# Patient Record
Sex: FEMALE | Race: WHITE | URBAN - METROPOLITAN AREA
[De-identification: names, ages, dates, MRNs, and addresses within clinical notes are randomized per-mention and may not be internally consistent; named-entity substitution may affect disease eponyms.]

---

## 2017-02-07 ENCOUNTER — OFFICE VISIT (OUTPATIENT)
Dept: DERMATOLOGY | Age: 52
End: 2017-02-07

## 2017-02-07 DIAGNOSIS — D48.5 NEOPLASM OF UNCERTAIN BEHAVIOR OF SKIN: ICD-10-CM

## 2017-02-07 DIAGNOSIS — Z85.828 HISTORY OF NONMELANOMA SKIN CANCER: ICD-10-CM

## 2017-02-07 DIAGNOSIS — L82.1 SEBORRHEIC KERATOSES: ICD-10-CM

## 2017-02-07 DIAGNOSIS — D22.9 BENIGN NEVUS: Primary | ICD-10-CM

## 2017-02-07 DIAGNOSIS — L57.8 DIFFUSE PHOTODAMAGE OF SKIN: ICD-10-CM

## 2017-02-07 DIAGNOSIS — B07.8 OTHER VIRAL WARTS: ICD-10-CM

## 2017-02-07 DIAGNOSIS — Z85.820 HISTORY OF MELANOMA: ICD-10-CM

## 2017-02-07 PROCEDURE — 11100 PR BIOPSY OF SKIN LESION: CPT | Performed by: DERMATOLOGY

## 2017-02-07 PROCEDURE — 17110 DESTRUCTION B9 LES UP TO 14: CPT | Performed by: DERMATOLOGY

## 2017-02-07 PROCEDURE — 11101 PR BIOPSY, EACH ADDED LESION: CPT | Performed by: DERMATOLOGY

## 2017-02-07 PROCEDURE — 99202 OFFICE O/P NEW SF 15 MIN: CPT | Performed by: DERMATOLOGY

## 2017-02-14 ENCOUNTER — TELEPHONE (OUTPATIENT)
Dept: DERMATOLOGY | Age: 52
End: 2017-02-14

## 2017-03-09 ENCOUNTER — PROCEDURE VISIT (OUTPATIENT)
Dept: DERMATOLOGY | Age: 52
End: 2017-03-09

## 2017-03-09 DIAGNOSIS — C44.619: Primary | ICD-10-CM

## 2017-03-09 PROCEDURE — 17262 DSTRJ MAL LES T/A/L 1.1-2.0: CPT | Performed by: DERMATOLOGY

## 2017-11-21 ENCOUNTER — OFFICE VISIT (OUTPATIENT)
Dept: DERMATOLOGY | Age: 52
End: 2017-11-21

## 2017-11-21 DIAGNOSIS — B07.8 OTHER VIRAL WARTS: ICD-10-CM

## 2017-11-21 DIAGNOSIS — L57.0 KERATOSIS, ACTINIC: ICD-10-CM

## 2017-11-21 DIAGNOSIS — C44.519 BASAL CELL CARCINOMA OF BACK: ICD-10-CM

## 2017-11-21 DIAGNOSIS — D22.9 BENIGN NEVUS: ICD-10-CM

## 2017-11-21 DIAGNOSIS — Z85.820 HISTORY OF MELANOMA: ICD-10-CM

## 2017-11-21 DIAGNOSIS — D48.5 NEOPLASM OF UNCERTAIN BEHAVIOR OF SKIN: Primary | ICD-10-CM

## 2017-11-21 DIAGNOSIS — Z85.828 HISTORY OF NONMELANOMA SKIN CANCER: ICD-10-CM

## 2017-11-21 PROCEDURE — 17003 DESTRUCT PREMALG LES 2-14: CPT | Performed by: DERMATOLOGY

## 2017-11-21 PROCEDURE — 17000 DESTRUCT PREMALG LESION: CPT | Performed by: DERMATOLOGY

## 2017-11-21 PROCEDURE — 17261 DSTRJ MAL LES T/A/L .6-1.0CM: CPT | Performed by: DERMATOLOGY

## 2017-11-21 PROCEDURE — 11101 PR BIOPSY, EACH ADDED LESION: CPT | Performed by: DERMATOLOGY

## 2017-11-21 PROCEDURE — 17111 DESTRUCTION B9 LESIONS 15/>: CPT | Performed by: DERMATOLOGY

## 2017-11-21 PROCEDURE — 99214 OFFICE O/P EST MOD 30 MIN: CPT | Performed by: DERMATOLOGY

## 2017-11-21 PROCEDURE — 11100 PR BIOPSY OF SKIN LESION: CPT | Performed by: DERMATOLOGY

## 2017-11-21 NOTE — PROGRESS NOTES
[facility]) Dermatology  Bala Ortega M.D.  [phone]       Suni Bernal  1965    49 y.o. female     Date of Visit: 11/21/2017    Chief Complaint:   Chief Complaint   Patient presents with    Skin Exam        I was asked to see this patient by Dr. Pitts ref. provider found. History of Present Illness:  1. Total body skin exam    New erythematous papule left nasal supratip. Present for about a month. Not itching, bleeding, growing    Multiple nevi. Stable in size, shape, color. Asymptomatic. Patient is monitoring for change. Does wear hats and sunscreen. Multiple flat warts over her lower legs-stop shaving. They had increased in number. Raised. Scaly. 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 wide local excision  5/12 left inferior parasternal chest superficial basal cell carcinoma  5/12 left superior parasternal chest nodular basal cell carcinoma  5/12 left parasternal chest superficial basal cell carcinoma  9/14 melanoma in situ left posterior shoulder status post excision  9/14 right lower back dysplastic nevus with moderate dysplasia  7/15 superficial basal cell carcinoma mid upper back, mid lower back, right anterior leg status post curettage  7/15 malignant melanoma Breslow depth 0.7 without ulceration, no progression, mitotic rate less than 1 left anterior arm status post wide local excision    3/17 superficial basal cell carcinoma left anterior shoulder status post Aldara at outside hospital, recurrent. Then treated with curettage. Recurrent-refer to Mohs  11/17 Right upper back superficial basal cell carcinoma status post curettage    Sister with history of malignant melanoma.   Father with a history of nonmelanoma skin cancer    Review of Systems:  Constitutional: Reports general sense of well-being       Past Medical History, Surgical History, Family History, Medications and Allergies reviewed. Social History:   Social History     Social History    Marital status:      Spouse name: N/A    Number of children: N/A    Years of education: N/A     Occupational History    Not on file. Social History Main Topics    Smoking status: Never Smoker    Smokeless tobacco: Never Used    Alcohol use Yes    Drug use: Unknown    Sexual activity: Not on file     Other Topics Concern    Not on file     Social History Narrative    No narrative on file       Physical Examination       -General: Well-appearing, NAD  Normal affect. Total body skin exam including scalp, face, neck, chest, abdomen, back, bilateral upper extremities, bilateral lower extremities, ocular conjunctiva, external lips, and nails was performed. Underwear area not examined. Scattered on the trunk and extremities are multiple well-defined round and oval symmetric smoothly-bordered uniformly brown macules and papules. Multiple gritty erythematous papules face, chest-actinic keratoses. Multiple flat topped pink-yellow papules lower legs-flat warts. Multiple well-healed scars without sign of recurrence    Left nasal supratip 3 mm pink papule rule out basal cell carcinoma  Left lateral shoulder well-healed scar. New 4 mm pink papule and central scar-rule out recurrence  Right upper paraspinal back 6 mm pink papule rule out basal cell carcinoma                      Assessment and Plan     1. Neoplasm of uncertain behavior of skin -Left nasal supratip, left lateral shoulder, right upper paraspinal back-  -Discussed possible diagnosis. Patient agreeable to biopsy. Verbal consent obtained after risks (infection, bleeding, scar), benefits and alternatives explained. -Area(s) to be biopsied were marked with a surgical pen. Site(s) were cleansed with alcohol.  Local anesthesia achieved with 1% lidocaine with epinephrine/sodium bicarbonate. Shave biopsy performed with a razor blade. Hemostasis was achieved with aluminum chloride. The wound(s) were dressed with petrolatum and covered with a bandage. Specimen(s) sent to pathology. Pt educated re: risk of bleeding, infection, scar and wound care instructions. Right upper paraspinal back only:  Curettage performed after informed written consent obtained following explanation of risks, benefits, alternatives  -Local anesthesia acheived with 1% lidocaine with epinephrine/sodium bicarbonate. Sharp curettage performed in 3 different directions. First pass size 7mm. Hemostasis obtained with aluminum chloride.   -Edu re: bleeding, discomfort, infection, scar  -Edu re: wound care, risk of recurrence     2. Keratosis, actinic - 9-face/ chest lesion(s) treated w/ liquid nitrogen. Edu re: risk of blister formation, discomfort, scar, hypopigmentation. Discussed wound care. 3. Benign nevus - Benign acquired melanocytic nevi  -Recommend monthly self skin exams   -Educated regarding the ABCDEs of melanoma detection   -Call for any new/changing moles or concerning lesions  -Reviewed sun protective behavior -- sun avoidance during the peak hours of the day, sun-protective clothing (including hat and sunglasses), sunscreen use (water resistant, broad spectrum, SPF at least 30, need for reapplication every 2 to 3 hours), avoidance of tanning beds      4. Other viral warts - 22-legs lesion(s) treated w/ liquid nitrogen. Edu re: risk of blister formation, discomfort, scar, hypopigmentation. Discussed wound care. 5. History of melanoma - No evidence of recurrence. Discussed risk of subsequent skin cancers and increased risk of melanoma. Reviewed importance of monitoring skin for change and sun protection with hats and sunscreen, as well as sun avoidance. 6. History of nonmelanoma skin cancer - No evidence of recurrence.  Discussed risk of

## 2017-11-30 ENCOUNTER — TELEPHONE (OUTPATIENT)
Dept: DERMATOLOGY | Age: 52
End: 2017-11-30

## 2017-11-30 DIAGNOSIS — C44.619 BASAL CELL CARCINOMA OF SKIN OF LEFT UPPER EXTREMITY, INCLUDING SHOULDER: Primary | ICD-10-CM

## 2017-11-30 NOTE — TELEPHONE ENCOUNTER
Reviewed results of the biopsy with the patient. Date of biopsy: 11/21/2017  Site of biopsy: R upper back  Result: Basal cell carcinoma, superficial    Plan: Treated with curette at biopsy    Date of biopsy: 11/21/2017  Site of biopsy: L lateral shoulder  Result: Basal cell carcinoma, superficial    Plan: Mohs, referral placed for Dr. Veronica Mccoy    Date of biopsy: 11/21/2017  Site of biopsy: L nasal supratip  Result: Basosquamous acanthoma, inflamed    Plan: No further treatment needed. The patient expressed understanding of the plan.

## 2018-01-30 ENCOUNTER — OFFICE VISIT (OUTPATIENT)
Dept: DERMATOLOGY | Age: 53
End: 2018-01-30

## 2018-01-30 DIAGNOSIS — T81.89XA SUTURE GRANULOMA, INITIAL ENCOUNTER: ICD-10-CM

## 2018-01-30 DIAGNOSIS — B07.8 OTHER VIRAL WARTS: Primary | ICD-10-CM

## 2018-01-30 PROCEDURE — 99999 PR OFFICE/OUTPT VISIT,PROCEDURE ONLY: CPT | Performed by: DERMATOLOGY

## 2018-01-30 PROCEDURE — 17111 DESTRUCTION B9 LESIONS 15/>: CPT | Performed by: DERMATOLOGY

## 2018-01-30 NOTE — PROGRESS NOTES
Systems:  Constitutional: Reports general sense of well-being       Past Medical History, Surgical History, Family History, Medications and Allergies reviewed. Social History:   Social History     Social History    Marital status:      Spouse name: N/A    Number of children: N/A    Years of education: N/A     Occupational History    Not on file. Social History Main Topics    Smoking status: Never Smoker    Smokeless tobacco: Never Used    Alcohol use Yes    Drug use: Unknown    Sexual activity: Not on file     Other Topics Concern    Not on file     Social History Narrative    No narrative on file       Physical Examination       -General: Well-appearing, NAD  Multiple flat topped pink papules over her lower legs ranging in size from 1-4 mm-viral verruca. Left lateral shoulder well-healed excision site with 4 mm crusted inflammatory papule inferior margin      Assessment and Plan     1. Other viral warts - 32-legs lesion(s) treated w/ liquid nitrogen. Edu re: risk of blister formation, discomfort, scar, hypopigmentation. Discussed wound care. 2. Suture granuloma, initial encounter -After the risks complications alternatives were explained to the patient informed consent was obtained. Lesion was cleansed with alcohol and opened with a 22-gauge needle. No suture visualized.   Wound care instructions given to the patient    Total body skin exam return visit with ongoing treatment of verruca

## 2018-04-03 ENCOUNTER — OFFICE VISIT (OUTPATIENT)
Dept: DERMATOLOGY | Age: 53
End: 2018-04-03

## 2018-04-03 DIAGNOSIS — D48.5 NEOPLASM OF UNCERTAIN BEHAVIOR OF SKIN: Primary | ICD-10-CM

## 2018-04-03 DIAGNOSIS — C44.612 BASAL CELL CARCINOMA OF RIGHT UPPER ARM: ICD-10-CM

## 2018-04-03 DIAGNOSIS — D22.9 BENIGN NEVUS: ICD-10-CM

## 2018-04-03 DIAGNOSIS — Z85.820 HISTORY OF MELANOMA: ICD-10-CM

## 2018-04-03 DIAGNOSIS — L65.8 FEMALE PATTERN ALOPECIA: ICD-10-CM

## 2018-04-03 DIAGNOSIS — Z85.828 HISTORY OF NONMELANOMA SKIN CANCER: ICD-10-CM

## 2018-04-03 DIAGNOSIS — L57.8 DIFFUSE PHOTODAMAGE OF SKIN: ICD-10-CM

## 2018-04-03 DIAGNOSIS — B07.8 OTHER VIRAL WARTS: ICD-10-CM

## 2018-04-03 PROCEDURE — 17261 DSTRJ MAL LES T/A/L .6-1.0CM: CPT | Performed by: DERMATOLOGY

## 2018-04-03 PROCEDURE — 17111 DESTRUCTION B9 LESIONS 15/>: CPT | Performed by: DERMATOLOGY

## 2018-04-03 PROCEDURE — 11100 PR BIOPSY OF SKIN LESION: CPT | Performed by: DERMATOLOGY

## 2018-04-03 PROCEDURE — 99214 OFFICE O/P EST MOD 30 MIN: CPT | Performed by: DERMATOLOGY

## 2018-04-10 ENCOUNTER — TELEPHONE (OUTPATIENT)
Dept: DERMATOLOGY | Age: 53
End: 2018-04-10

## 2018-07-17 ENCOUNTER — OFFICE VISIT (OUTPATIENT)
Dept: DERMATOLOGY | Age: 53
End: 2018-07-17

## 2018-07-17 DIAGNOSIS — D22.9 BENIGN NEVUS: ICD-10-CM

## 2018-07-17 DIAGNOSIS — Z85.828 HISTORY OF NONMELANOMA SKIN CANCER: ICD-10-CM

## 2018-07-17 DIAGNOSIS — Z85.820 HISTORY OF MELANOMA: ICD-10-CM

## 2018-07-17 DIAGNOSIS — L91.0 KELOID: ICD-10-CM

## 2018-07-17 DIAGNOSIS — L57.8 DIFFUSE PHOTODAMAGE OF SKIN: ICD-10-CM

## 2018-07-17 DIAGNOSIS — C44.519 BASAL CELL CARCINOMA OF BACK: ICD-10-CM

## 2018-07-17 DIAGNOSIS — D04.5 SQUAMOUS CELL CARCINOMA IN SITU OF SKIN OF CHEST: ICD-10-CM

## 2018-07-17 DIAGNOSIS — D48.5 NEOPLASM OF UNCERTAIN BEHAVIOR OF SKIN: Primary | ICD-10-CM

## 2018-07-17 PROCEDURE — 11900 INJECT SKIN LESIONS </W 7: CPT | Performed by: DERMATOLOGY

## 2018-07-17 PROCEDURE — 11100 PR BIOPSY OF SKIN LESION: CPT | Performed by: DERMATOLOGY

## 2018-07-17 PROCEDURE — 17262 DSTRJ MAL LES T/A/L 1.1-2.0: CPT | Performed by: DERMATOLOGY

## 2018-07-17 PROCEDURE — 99214 OFFICE O/P EST MOD 30 MIN: CPT | Performed by: DERMATOLOGY

## 2018-07-17 PROCEDURE — 17263 DSTRJ MAL LES T/A/L 2.1-3.0: CPT | Performed by: DERMATOLOGY

## 2018-07-17 RX ORDER — TRIAMCINOLONE ACETONIDE 40 MG/ML
40 INJECTION, SUSPENSION INTRA-ARTICULAR; INTRAMUSCULAR ONCE
Status: COMPLETED | OUTPATIENT
Start: 2018-07-17 | End: 2018-07-17

## 2018-07-17 RX ORDER — LORAZEPAM 1 MG/1
TABLET ORAL
COMMUNITY
Start: 2018-05-18

## 2018-07-17 RX ADMIN — TRIAMCINOLONE ACETONIDE 40 MG: 40 INJECTION, SUSPENSION INTRA-ARTICULAR; INTRAMUSCULAR at 14:37

## 2018-07-17 NOTE — PROGRESS NOTES
basal cell carcinoma status post curettage  11/17 left lateral shoulder-recurrent basal cell carcinoma status post Aldara, curettage 3/17,Mohs 12/17 4/18 right lateral upper arm superficial basal cell carcinoma status post curettage     Sister with history of malignant melanoma.  Father with a history of nonmelanoma skin cancer    Review of Systems:  Constitutional: Reports general sense of well-being   Skin: No new rashes    Past Medical History, Surgical History, Family History, Medications and Allergies reviewed. Social History:   Social History     Social History    Marital status:      Spouse name: N/A    Number of children: N/A    Years of education: N/A     Occupational History    Not on file. Social History Main Topics    Smoking status: Never Smoker    Smokeless tobacco: Never Used    Alcohol use Yes    Drug use: Unknown    Sexual activity: Not on file     Other Topics Concern    Not on file     Social History Narrative    No narrative on file       Physical Examination       -General: Well-appearing, NAD  Normal affect. Total body skin exam including scalp, face, neck, chest, abdomen, back, bilateral upper extremities, bilateral lower extremities, ocular conjunctiva, external lips, and nails was performed. Examination normal unless stated below. Underwear area not examined. Scattered on the trunk and extremities are multiple well-defined round and oval symmetric smoothly-bordered uniformly brown macules and papules. Raised firm pink scar left lateral shoulder-keloid  Diffuse back ground photo damage with freckling and lentigines  Multiple raised pink-orange papules over her lower legs and thighs-flat warts, seborrheic keratoses.   Representative lesion- raised pink- orange slightly scaly papule right anterior shin, 4 mm  Right mid lateral back 2.1 cm erythematous plaque-rule out basal cell carcinoma  Left inframammary chest 1.0 cm erythematous plaque, rule out basal cell carcinoma                Multiple well-healed scars at sites of prior skin cancers           Assessment and Plan     1. Neoplasm of uncertain behavior of skin -Right anterior shin, right lateral back, left inframammary chest--Discussed possible diagnosis. Patient agreeable to biopsy. Verbal consent obtained after risks (infection, bleeding, scar), benefits and alternatives explained. -Area(s) to be biopsied were marked with a surgical pen. Site(s) were cleansed with alcohol. Local anesthesia achieved with 1% lidocaine with epinephrine/sodium bicarbonate. Shave biopsy performed with a razor blade. Hemostasis was achieved with aluminum chloride. The wound(s) were dressed with petrolatum and covered with a bandage. Specimen(s) sent to pathology. Pt educated re: risk of bleeding, infection, scar and wound care instructions. Right lateral back and left inframammary chest only:   Curettage performed after informed written consent obtained following explanation of risks, benefits, alternatives  -Local anesthesia acheived with 1% lidocaine with epinephrine/sodium bicarbonate. Sharp curettage performed in 3 different directions. First pass size 22mm right mid lateral back, 1.1 cm left inframammary chest. Hemostasis obtained with aluminum chloride.   -Edu re: bleeding, discomfort, infection, scar  -Edu re: wound care, risk of recurrence     2. Keloid - -IL kenalog 20 mg/ml; total .2 ml to 1 lesion(s). Edu re: atrophy, dyspigmentation. 3. Benign nevus - Benign acquired melanocytic nevi  -Recommend monthly self skin exams   -Educated regarding the ABCDEs of melanoma detection   -Call for any new/changing moles or concerning lesions  -Reviewed sun protective behavior -- sun avoidance during the peak hours of the day, sun-protective clothing (including hat and sunglasses), sunscreen use (water resistant, broad spectrum, SPF at least 30, need for reapplication every 2 to 3 hours), avoidance of tanning beds      4.

## 2018-07-24 ENCOUNTER — TELEPHONE (OUTPATIENT)
Dept: DERMATOLOGY | Age: 53
End: 2018-07-24

## 2018-10-30 ENCOUNTER — OFFICE VISIT (OUTPATIENT)
Dept: DERMATOLOGY | Age: 53
End: 2018-10-30
Payer: COMMERCIAL

## 2018-10-30 DIAGNOSIS — Z85.828 HISTORY OF NONMELANOMA SKIN CANCER: ICD-10-CM

## 2018-10-30 DIAGNOSIS — L57.0 KERATOSIS, ACTINIC: ICD-10-CM

## 2018-10-30 DIAGNOSIS — Z85.820 HISTORY OF MELANOMA: ICD-10-CM

## 2018-10-30 DIAGNOSIS — D48.5 NEOPLASM OF UNCERTAIN BEHAVIOR OF SKIN: Primary | ICD-10-CM

## 2018-10-30 DIAGNOSIS — L57.8 DIFFUSE PHOTODAMAGE OF SKIN: ICD-10-CM

## 2018-10-30 DIAGNOSIS — D22.9 BENIGN NEVUS: ICD-10-CM

## 2018-10-30 DIAGNOSIS — L91.0 KELOID: ICD-10-CM

## 2018-10-30 DIAGNOSIS — B07.8 OTHER VIRAL WARTS: ICD-10-CM

## 2018-10-30 PROCEDURE — 11100 PR BIOPSY OF SKIN LESION: CPT | Performed by: DERMATOLOGY

## 2018-10-30 PROCEDURE — 17110 DESTRUCTION B9 LES UP TO 14: CPT | Performed by: DERMATOLOGY

## 2018-10-30 PROCEDURE — 99214 OFFICE O/P EST MOD 30 MIN: CPT | Performed by: DERMATOLOGY

## 2018-10-30 PROCEDURE — 11101 PR BIOPSY, EACH ADDED LESION: CPT | Performed by: DERMATOLOGY

## 2018-10-30 PROCEDURE — 17000 DESTRUCT PREMALG LESION: CPT | Performed by: DERMATOLOGY

## 2018-10-30 RX ORDER — IMIQUIMOD 12.5 MG/.25G
CREAM TOPICAL
Qty: 24 EACH | Refills: 5 | Status: SHIPPED | OUTPATIENT
Start: 2018-10-30 | End: 2018-11-06

## 2018-10-30 NOTE — PROGRESS NOTES
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  Father with a history of nonmelanoma skin cancer    Review of Systems:  Constitutional: Reports general sense of well-being   Skin: No new rashes or changing pigmented lesions    Past Medical History, Surgical History, Family History, Medications and Allergies reviewed. Social History:   Social History     Social History    Marital status:      Spouse name: N/A    Number of children: N/A    Years of education: N/A     Occupational History    Not on file. Social History Main Topics    Smoking status: Never Smoker    Smokeless tobacco: Never Used    Alcohol use Yes    Drug use: Unknown    Sexual activity: Not on file     Other Topics Concern    Not on file     Social History Narrative    No narrative on file       Physical Examination       -General: Well-appearing, NAD  Normal affect. Total body skin exam including scalp, face, neck, chest, abdomen, back, bilateral upper extremities, bilateral lower extremities, ocular conjunctiva, external lips, and nails was performed. Examination normal unless stated below. Underwear area not examined. Scattered on the trunk and extremities are multiple well-defined round and oval symmetric smoothly-bordered uniformly brown macules and papules. Keloidal scar left upper arm. Diffuse background photodamage with freckling and lentigines. Gritty erythematous papule left mid dorsal forearm. Right lateral forehead at hairline 5 mm shiny pink papule rule out basal cell carcinoma  Left supraclavicular neck 6mm raised pink papule rule out basal cell carcinoma  Right lower back 9 mm firm tan papule adjacent to light tan papule-rule out atypia    Assessment and Plan     1.  Neoplasm of uncertain behavior of skin -Left supraclavicular neck,

## 2018-11-02 LAB — DERMATOLOGY PATHOLOGY REPORT: ABNORMAL

## 2018-11-05 ENCOUNTER — TELEPHONE (OUTPATIENT)
Dept: DERMATOLOGY | Age: 53
End: 2018-11-05

## 2018-11-05 DIAGNOSIS — C44.319 BASAL CELL CARCINOMA (BCC) OF FOREHEAD: Primary | ICD-10-CM

## 2019-03-05 ENCOUNTER — OFFICE VISIT (OUTPATIENT)
Dept: DERMATOLOGY | Age: 54
End: 2019-03-05
Payer: COMMERCIAL

## 2019-03-05 DIAGNOSIS — D48.5 NEOPLASM OF UNCERTAIN BEHAVIOR OF SKIN: Primary | ICD-10-CM

## 2019-03-05 DIAGNOSIS — Z85.828 HISTORY OF NONMELANOMA SKIN CANCER: ICD-10-CM

## 2019-03-05 DIAGNOSIS — Z85.820 HISTORY OF MELANOMA: ICD-10-CM

## 2019-03-05 DIAGNOSIS — B07.8 OTHER VIRAL WARTS: ICD-10-CM

## 2019-03-05 DIAGNOSIS — L91.0 KELOID: ICD-10-CM

## 2019-03-05 DIAGNOSIS — D22.9 BENIGN NEVUS: ICD-10-CM

## 2019-03-05 DIAGNOSIS — L57.0 KERATOSIS, ACTINIC: ICD-10-CM

## 2019-03-05 PROCEDURE — 11102 TANGNTL BX SKIN SINGLE LES: CPT | Performed by: DERMATOLOGY

## 2019-03-05 PROCEDURE — 99214 OFFICE O/P EST MOD 30 MIN: CPT | Performed by: DERMATOLOGY

## 2019-03-06 ENCOUNTER — TELEPHONE (OUTPATIENT)
Dept: DERMATOLOGY | Age: 54
End: 2019-03-06

## 2019-03-11 LAB — DERMATOLOGY PATHOLOGY REPORT: NORMAL

## 2019-05-08 ENCOUNTER — NURSE ONLY (OUTPATIENT)
Dept: DERMATOLOGY | Age: 54
End: 2019-05-08

## 2019-05-08 DIAGNOSIS — L57.0 KERATOSIS, ACTINIC: Primary | ICD-10-CM

## 2019-05-08 NOTE — PROGRESS NOTES
Photodynamic Therapy Procedure Note     Diagnosis: Actinic Keratoses    Location:    -Face    -Chest     Procedure: The sites of treatment were verified with the patient and the previous progress note. The area to be treated was cleansed with alcohol. Nicholes Serge was applied to the indicated area(s) above. 1 hour (time in hours) later the patient was exposed to blue light via the [place] for 16 minutes and 40 seconds. 2 Levulan Kerastick(s) were used. BelRaquel Fernandesevelynshi [address]: [id number]    The patient was provided with appropriate eye protection. The patient tolerated the procedure well. There were no immediate complications. The patient was educated regarding the potential side effects and risks including burning, stinging, erythema, edema, crusting, and dyspigmentation. The patient was instructed to avoid sun or intense light for 48 hours after the treatment.

## 2019-11-26 ENCOUNTER — OFFICE VISIT (OUTPATIENT)
Dept: DERMATOLOGY | Age: 54
End: 2019-11-26
Payer: COMMERCIAL

## 2019-11-26 DIAGNOSIS — Z85.828 HISTORY OF NONMELANOMA SKIN CANCER: ICD-10-CM

## 2019-11-26 DIAGNOSIS — Z85.820 HISTORY OF MELANOMA: ICD-10-CM

## 2019-11-26 DIAGNOSIS — L57.0 KERATOSIS, ACTINIC: ICD-10-CM

## 2019-11-26 DIAGNOSIS — L82.0 SEBORRHEIC KERATOSES, INFLAMED: ICD-10-CM

## 2019-11-26 DIAGNOSIS — D22.9 BENIGN NEVUS: ICD-10-CM

## 2019-11-26 DIAGNOSIS — B07.8 OTHER VIRAL WARTS: ICD-10-CM

## 2019-11-26 DIAGNOSIS — D48.5 NEOPLASM OF UNCERTAIN BEHAVIOR OF SKIN: Primary | ICD-10-CM

## 2019-11-26 PROCEDURE — 17110 DESTRUCTION B9 LES UP TO 14: CPT | Performed by: DERMATOLOGY

## 2019-11-26 PROCEDURE — 11103 TANGNTL BX SKIN EA SEP/ADDL: CPT | Performed by: DERMATOLOGY

## 2019-11-26 PROCEDURE — 99214 OFFICE O/P EST MOD 30 MIN: CPT | Performed by: DERMATOLOGY

## 2019-11-26 PROCEDURE — 11102 TANGNTL BX SKIN SINGLE LES: CPT | Performed by: DERMATOLOGY

## 2019-11-26 RX ORDER — FLUOROURACIL 50 MG/G
CREAM TOPICAL
Qty: 40 G | Refills: 0 | Status: SHIPPED | OUTPATIENT
Start: 2019-11-26 | End: 2020-12-21

## 2019-12-03 LAB — DERMATOLOGY PATHOLOGY REPORT: NORMAL

## 2020-03-18 ENCOUNTER — TELEPHONE (OUTPATIENT)
Dept: DERMATOLOGY | Age: 55
End: 2020-03-18

## 2020-06-03 ENCOUNTER — OFFICE VISIT (OUTPATIENT)
Dept: DERMATOLOGY | Age: 55
End: 2020-06-03
Payer: COMMERCIAL

## 2020-06-03 PROCEDURE — 99213 OFFICE O/P EST LOW 20 MIN: CPT | Performed by: DERMATOLOGY

## 2020-06-03 NOTE — PROGRESS NOTES
lateral back superficial basal cell carcinoma status post curettage  12/18 right mid lateral forehead nodular basal cell carcinoma status post Mohs  5/19 face and chest PDT  12/19 Efudex forehead, temples, preauricular cheeks     Sister with history of malignant melanoma.  Father with a history of nonmelanoma skin cancer    Review of Systems:  Constitutional: Reports general sense of well-being       Past Medical History, Surgical History, Family History, Medications and Allergies reviewed. Social History:   Social History     Socioeconomic History    Marital status:      Spouse name: Not on file    Number of children: Not on file    Years of education: Not on file    Highest education level: Not on file   Occupational History    Not on file   Social Needs    Financial resource strain: Not on file    Food insecurity     Worry: Not on file     Inability: Not on file    Transportation needs     Medical: Not on file     Non-medical: Not on file   Tobacco Use    Smoking status: Never Smoker    Smokeless tobacco: Never Used   Substance and Sexual Activity    Alcohol use: Yes    Drug use: Not on file    Sexual activity: Not on file   Lifestyle    Physical activity     Days per week: Not on file     Minutes per session: Not on file    Stress: Not on file   Relationships    Social connections     Talks on phone: Not on file     Gets together: Not on file     Attends Sabianist service: Not on file     Active member of club or organization: Not on file     Attends meetings of clubs or organizations: Not on file     Relationship status: Not on file    Intimate partner violence     Fear of current or ex partner: Not on file     Emotionally abused: Not on file     Physically abused: Not on file     Forced sexual activity: Not on file   Other Topics Concern    Not on file   Social History Narrative    Not on file       Physical Examination       -General: Well-appearing, NAD  Normal affect.   Total

## 2020-10-21 ENCOUNTER — OFFICE VISIT (OUTPATIENT)
Dept: DERMATOLOGY | Age: 55
End: 2020-10-21
Payer: COMMERCIAL

## 2020-10-21 VITALS — TEMPERATURE: 98 F

## 2020-10-21 PROCEDURE — 99214 OFFICE O/P EST MOD 30 MIN: CPT | Performed by: DERMATOLOGY

## 2020-10-21 PROCEDURE — 11102 TANGNTL BX SKIN SINGLE LES: CPT | Performed by: DERMATOLOGY

## 2020-10-21 PROCEDURE — 11103 TANGNTL BX SKIN EA SEP/ADDL: CPT | Performed by: DERMATOLOGY

## 2020-10-21 NOTE — PROGRESS NOTES
[facility]) Dermatology  Zeinab Herrera M.D.  [phone]       Krystle Gracia  1965    49 y.o. female     Date of Visit: 10/21/2020    Chief Complaint:   Chief Complaint   Patient presents with    Skin Lesion        I was asked to see this patient by Dr. Pitts ref. provider found. History of Present Illness:  1. Total-body skin exam    New erythematous papule right distal anterior lower leg-present for at least a month. Not itching, bleeding. Asymptomatic    New firm tan papule left ulnar forearm. Present 1 to 2 months. Developed suddenly. Not itching, bleeding. Asymptomatic. No preceding arthropod bite    Flat warts bilateral lower legs-increasing in size and number and spreading. Has tried liquid nitrogen and imiquimod previously. Actinic keratoses-slow recurrence of her involvement on her forehead, temples. Also with multiple actinic keratoses chest.  Dry but not itching, bleeding. Asymptomatic. Multiple nevi. Stable in size, shape, color.   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 wide local excision  5/12 left inferior parasternal chest superficial basal cell carcinoma  5/12 left superior parasternal chest nodular basal cell carcinoma  5/12 left parasternal chest superficial basal cell carcinoma  9/14 melanoma in situ left posterior shoulder status post excision  9/14 right lower back dysplastic nevus with moderate dysplasia  7/15 superficial basal cell carcinoma mid upper back, mid lower back, right anterior leg status post curettage  7/15 malignant melanoma Breslow depth 0.7 without ulceration, no progression, mitotic rate less than 1 left anterior arm status post wide local excision     3/17 superficial basal cell carcinoma left anterior shoulder status post Aldara at outside hospital, recurrent.  Then treated with curettage. Recurrent-refer to Mohs  11/17 Right upper back superficial basal cell carcinoma status post curettage  11/17 left lateral shoulder-recurrent basal cell carcinoma status post Aldara, curettage 3/17,Mohs 12/17 4/18 right lateral upper arm superficial basal cell carcinoma status post curettage  7/18 left inframammary chest Bowen's disease status post curettage  7/18 right lateral back superficial basal cell carcinoma status post curettage  12/18 right mid lateral forehead nodular basal cell carcinoma status post Mohs  5/19 face and chest PDT  12/19 Efudex forehead, temples, preauricular cheeks     Sister with history of malignant melanoma.  Father with a history of nonmelanoma skin cancer       Review of Systems:  Constitutional: Reports general sense of well-being   Skin-no new rashes    Past Medical History, Surgical History, Family History, Medications and Allergies reviewed. Social History:   Social History     Socioeconomic History    Marital status:      Spouse name: Not on file    Number of children: Not on file    Years of education: Not on file    Highest education level: Not on file   Occupational History    Not on file   Social Needs    Financial resource strain: Not on file    Food insecurity     Worry: Not on file     Inability: Not on file    Transportation needs     Medical: Not on file     Non-medical: Not on file   Tobacco Use    Smoking status: Never Smoker    Smokeless tobacco: Never Used   Substance and Sexual Activity    Alcohol use:  Yes    Drug use: Not on file    Sexual activity: Not on file   Lifestyle    Physical activity     Days per week: Not on file     Minutes per session: Not on file    Stress: Not on file   Relationships    Social connections     Talks on phone: Not on file     Gets together: Not on file     Attends Yazidism service: Not on file     Active member of club or organization: Not on file     Attends meetings of clubs or organizations: Not on file     Relationship status: Not on file    Intimate partner violence     Fear of current or ex partner: Not on file     Emotionally abused: Not on file     Physically abused: Not on file     Forced sexual activity: Not on file   Other Topics Concern    Not on file   Social History Narrative    Not on file       Physical Examination       -General: Well-appearing, NAD  Normal affect. Total body skin exam including scalp, face, neck, chest, abdomen, back, bilateral upper extremities, bilateral lower extremities, ocular conjunctiva, external lips, and nails was performed. Examination normal unless stated below. Underwear area not examined. Scattered on the trunk and extremities are multiple well-defined round and oval symmetric smoothly-bordered uniformly brown macules and papules. Scattered gritty erythematous papules forehead, temples, chest  Multiple flattopped pink papules some of which are slightly scaly over her legs, thighs. Multiple well-healed scars no sign of recurrence              Left distal medial lower leg 4 mm erythematous papule rule out basal cell carcinoma  Right upper cutaneous lip 4 mm erythematous papule rule out basal cell carcinoma  Left ulnar forearm 6 mm firm tan papule dermatofibroma rule out atypia    Assessment and Plan     1. Neoplasm of uncertain behavior of skin -left distal medial lower leg, right upper cutaneous lip, left ulnar forearm-Discussed possible diagnosis. Patient agreeable to biopsy. Verbal consent obtained after risks (infection, bleeding, scar), benefits and alternatives explained. -Area(s) to be biopsied were marked with a surgical pen. Site(s) were cleansed with alcohol. Local anesthesia achieved with 1% lidocaine with epinephrine/sodium bicarbonate. Shave biopsy performed with a razor blade.  Hemostasis was achieved with aluminum chloride. The wound(s) were dressed with petrolatum and covered with a bandage. Specimen(s) sent to pathology. Pt educated re: risk of bleeding, infection, scar and wound care instructions. Left distal medial lower leg only:    Curettage performed after informed written consent obtained following explanation of risks, benefits, alternatives  -Local anesthesia acheived with 1% lidocaine with epinephrine/sodium bicarbonate. Sharp curettage performed in 3 different directions. First pass size 6mm. Hemostasis obtained with aluminum chloride.   -Edu re: bleeding, discomfort, infection, scar  -Edu re: wound care, risk of recurrence       2. Benign nevus - Benign acquired melanocytic nevi  -Recommend monthly self skin exams   -Educated regarding the ABCDEs of melanoma detection   -Call for any new/changing moles or concerning lesions  -Reviewed sun protective behavior -- sun avoidance during the peak hours of the day, sun-protective clothing (including hat and sunglasses), sunscreen use (water resistant, broad spectrum, SPF at least 30, need for reapplication every 2 to 3 hours), avoidance of tanning beds      3. Flat wart-set up multiple sequential 2 to 3-week follow-up for ongoing treatment with liquid nitrogen   4. Keratosis, actinic-Efudex 5% cream twice daily for 2 weeks forehead, temples, cheeks, chest.  Reviewed side effects, contraindications, proper application. Patient plans to treat over the winter   5. History of nonmelanoma skin cancer - No evidence of recurrence. Discussed risk of subsequent skin cancers and increased risk of melanoma. Reviewed importance of monitoring skin for change and sun protection with hats and sunscreen, as well as sun avoidance. 6. History of melanoma - No evidence of recurrence. Discussed risk of subsequent skin cancers and increased risk of melanoma. Reviewed importance of monitoring skin for change and sun protection with hats and sunscreen, as well as sun avoidance.

## 2020-10-23 LAB — DERMATOLOGY PATHOLOGY REPORT: NORMAL

## 2020-11-19 ENCOUNTER — OFFICE VISIT (OUTPATIENT)
Dept: DERMATOLOGY | Age: 55
End: 2020-11-19
Payer: COMMERCIAL

## 2020-11-19 VITALS — TEMPERATURE: 97.5 F

## 2020-11-19 PROCEDURE — 99213 OFFICE O/P EST LOW 20 MIN: CPT | Performed by: DERMATOLOGY

## 2020-11-19 PROCEDURE — 17111 DESTRUCTION B9 LESIONS 15/>: CPT | Performed by: DERMATOLOGY

## 2020-11-19 NOTE — PROGRESS NOTES
[facility]) Dermatology  Joie Bull M.D.  [phone]       Juventino Hernandez  1965    49 y.o. female     Date of Visit: 11/19/2020    Chief Complaint:   Chief Complaint   Patient presents with    Skin Lesion     bilateral leg spots-LN2        I was asked to see this patient by Dr. Pitts ref. provider found. History of Present Illness:  1. Patient presents today for sequential treatments of flat warts lower legs. Multiple pink flat warts over her lower and upper legs-has slowly increased in size and number over years. Now raised, scaly, bothersome. Spreading. History of malignant melanoma-multiple nevi. Has not noticed any changing in size, shape, color. 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 wide local excision  5/12 left inferior parasternal chest superficial basal cell carcinoma  5/12 left superior parasternal chest nodular basal cell carcinoma  5/12 left parasternal chest superficial basal cell carcinoma  9/14 melanoma in situ left posterior shoulder status post excision  9/14 right lower back dysplastic nevus with moderate dysplasia  7/15 superficial basal cell carcinoma mid upper back, mid lower back, right anterior leg status post curettage  7/15 malignant melanoma Breslow depth 0.7 without ulceration, no progression, mitotic rate less than 1 left anterior arm status post wide local excision     3/17 superficial basal cell carcinoma left anterior shoulder status post Aldara at outside hospital, recurrent.  Then treated with curettage. Recurrent-refer to Mohs  11/17 Right upper back superficial basal cell carcinoma status post curettage  11/17 left lateral shoulder-recurrent basal cell carcinoma status post Aldara, curettage 3/17,Mohs 12/17 4/18 right lateral upper arm superficial basal cell carcinoma status post curettage  7/18 left inframammary chest Bowen's disease status post curettage  7/18 right lateral back superficial basal cell carcinoma status post curettage  12/18 right mid lateral forehead nodular basal cell carcinoma status post Mohs  5/19 face and chest PDT  12/19 Efudex forehead, temples, preauricular cheeks  10/20 left distal medial lower leg hypertrophic actinic keratosis status post curettage     Sister with history of malignant melanoma.  Father with a history of nonmelanoma skin cancer    Review of Systems:  Constitutional: Reports general sense of well-being       Past Medical History, Surgical History, Family History, Medications and Allergies reviewed. Social History:   Social History     Socioeconomic History    Marital status:      Spouse name: Not on file    Number of children: Not on file    Years of education: Not on file    Highest education level: Not on file   Occupational History    Not on file   Social Needs    Financial resource strain: Not on file    Food insecurity     Worry: Not on file     Inability: Not on file    Transportation needs     Medical: Not on file     Non-medical: Not on file   Tobacco Use    Smoking status: Never Smoker    Smokeless tobacco: Never Used   Substance and Sexual Activity    Alcohol use:  Yes    Drug use: Not on file    Sexual activity: Not on file   Lifestyle    Physical activity     Days per week: Not on file     Minutes per session: Not on file    Stress: Not on file   Relationships    Social connections     Talks on phone: Not on file     Gets together: Not on file     Attends Pentecostalism service: Not on file     Active member of club or organization: Not on file     Attends meetings of clubs or organizations: Not on file     Relationship status: Not on file    Intimate partner violence     Fear of current or ex partner: Not on file     Emotionally abused: Not on file     Physically abused: Not on file     Forced sexual activity: Not on file   Other Topics Concern    Not on file   Social History Narrative    Not on file       Physical Examination       -General: Well-appearing, NAD  1. Multiple flattopped pink papules ranging in size from 1-3 mm over her lower legs, thighs  Curettage site right leg healing without difficulty  Scattered on the trunk and extremities are multiple well-defined round and oval symmetric smoothly-bordered uniformly brown macules and papules. Assessment and Plan     1. Flat wart -43-lateral lower legs lesion(s) treated w/ liquid nitrogen. Edu re: risk of blister formation, discomfort, scar, hypopigmentation. Discussed wound care. 2. Benign nevus - Benign acquired melanocytic nevi  -Recommend monthly self skin exams   -Educated regarding the ABCDEs of melanoma detection   -Call for any new/changing moles or concerning lesions  -Reviewed sun protective behavior -- sun avoidance during the peak hours of the day, sun-protective clothing (including hat and sunglasses), sunscreen use (water resistant, broad spectrum, SPF at least 30, need for reapplication every 2 to 3 hours), avoidance of tanning beds      3. History of melanoma - total-body skin exam as scheduled   4.  History of nonmelanoma skin cancer-total-body skin exam as scheduled

## 2020-12-21 ENCOUNTER — OFFICE VISIT (OUTPATIENT)
Dept: DERMATOLOGY | Age: 55
End: 2020-12-21
Payer: COMMERCIAL

## 2020-12-21 VITALS — TEMPERATURE: 97 F

## 2020-12-21 PROCEDURE — 17111 DESTRUCTION B9 LESIONS 15/>: CPT | Performed by: DERMATOLOGY

## 2020-12-21 NOTE — PROGRESS NOTES
[facility]) Dermatology  Lore Arevalo M.D.  [phone]       Sabino Rubalcava  1965    48 y.o. female     Date of Visit: 12/21/2020    Chief Complaint:   Chief Complaint   Patient presents with    Skin Lesion     LN2 legs        I was asked to see this patient by Dr. Pitts ref. provider found. History of Present Illness:  1. Patient presents today for follow-up of flat warts on her legs. 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 wide local excision  5/12 left inferior parasternal chest superficial basal cell carcinoma  5/12 left superior parasternal chest nodular basal cell carcinoma  5/12 left parasternal chest superficial basal cell carcinoma  9/14 melanoma in situ left posterior shoulder status post excision  9/14 right lower back dysplastic nevus with moderate dysplasia  7/15 superficial basal cell carcinoma mid upper back, mid lower back, right anterior leg status post curettage  7/15 malignant melanoma Breslow depth 0.7 without ulceration, no progression, mitotic rate less than 1 left anterior arm status post wide local excision     3/17 superficial basal cell carcinoma left anterior shoulder status post Aldara at outside hospital, recurrent.  Then treated with curettage. Recurrent-refer to Mohs  11/17 Right upper back superficial basal cell carcinoma status post curettage  11/17 left lateral shoulder-recurrent basal cell carcinoma status post Aldara, curettage 3/17,Mohs 12/17 4/18 right lateral upper arm superficial basal cell carcinoma status post curettage  7/18 left inframammary chest Bowen's disease status post curettage  7/18 right lateral back superficial basal cell carcinoma status post curettage  12/18 right mid lateral forehead nodular basal cell carcinoma status post Mohs  5/19 face and chest PDT  12/19 Efudex forehead, temples, preauricular cheeks  10/20 left distal medial lower leg hypertrophic actinic keratosis status post curettage     Sister with history of malignant melanoma.  Father with a history of nonmelanoma skin cancer       Review of Systems:  Constitutional: Reports general sense of well-being       Past Medical History, Surgical History, Family History, Medications and Allergies reviewed. Social History:   Social History     Socioeconomic History    Marital status:      Spouse name: Not on file    Number of children: Not on file    Years of education: Not on file    Highest education level: Not on file   Occupational History    Not on file   Social Needs    Financial resource strain: Not on file    Food insecurity     Worry: Not on file     Inability: Not on file    Transportation needs     Medical: Not on file     Non-medical: Not on file   Tobacco Use    Smoking status: Never Smoker    Smokeless tobacco: Never Used   Substance and Sexual Activity    Alcohol use:  Yes    Drug use: Not on file    Sexual activity: Not on file   Lifestyle    Physical activity     Days per week: Not on file     Minutes per session: Not on file    Stress: Not on file   Relationships    Social connections     Talks on phone: Not on file     Gets together: Not on file     Attends Sikhism service: Not on file     Active member of club or organization: Not on file     Attends meetings of clubs or organizations: Not on file     Relationship status: Not on file    Intimate partner violence     Fear of current or ex partner: Not on file     Emotionally abused: Not on file     Physically abused: Not on file     Forced sexual activity: Not on file   Other Topics Concern    Not on file   Social History Narrative    Not on file Physical Examination       -General: Well-appearing, NAD  1. Multiple flattopped pink papules bilateral thighs and lower legs-ranging in size from 1-3 mm. Multiple healing crusted erythematous papules from last treatment      Assessment and Plan     1. Flat wart -bilateral leg-38 lesion(s) treated w/ liquid nitrogen. Edu re: risk of blister formation, discomfort, scar, hypopigmentation. Discussed wound care.    Follow-up 1 month ongoing treatment

## 2021-01-14 ENCOUNTER — OFFICE VISIT (OUTPATIENT)
Dept: DERMATOLOGY | Age: 56
End: 2021-01-14
Payer: COMMERCIAL

## 2021-01-14 VITALS — TEMPERATURE: 98 F

## 2021-01-14 DIAGNOSIS — Z85.828 HISTORY OF NONMELANOMA SKIN CANCER: ICD-10-CM

## 2021-01-14 DIAGNOSIS — B07.8 FLAT WART: Primary | ICD-10-CM

## 2021-01-14 DIAGNOSIS — Z85.820 HISTORY OF MELANOMA: ICD-10-CM

## 2021-01-14 DIAGNOSIS — L57.0 KERATOSIS, ACTINIC: ICD-10-CM

## 2021-01-14 DIAGNOSIS — D22.9 BENIGN NEVUS: ICD-10-CM

## 2021-01-14 PROCEDURE — 99214 OFFICE O/P EST MOD 30 MIN: CPT | Performed by: DERMATOLOGY

## 2021-01-14 PROCEDURE — 17111 DESTRUCTION B9 LESIONS 15/>: CPT | Performed by: DERMATOLOGY

## 2021-01-14 NOTE — PROGRESS NOTES
status post curettage  7/18 left inframammary chest Bowen's disease status post curettage  7/18 right lateral back superficial basal cell carcinoma status post curettage  12/18 right mid lateral forehead nodular basal cell carcinoma status post Mohs  5/19 face and chest PDT  12/19 Efudex forehead, temples, preauricular cheeks  10/20 left distal medial lower leg hypertrophic actinic keratosis status post curettage     Sister with history of malignant melanoma.  Father with a history of nonmelanoma skin cancer       Review of Systems:  Constitutional: Reports general sense of well-being       Past Medical History, Surgical History, Family History, Medications and Allergies reviewed. Social History:   Social History     Socioeconomic History    Marital status:      Spouse name: Not on file    Number of children: Not on file    Years of education: Not on file    Highest education level: Not on file   Occupational History    Not on file   Social Needs    Financial resource strain: Not on file    Food insecurity     Worry: Not on file     Inability: Not on file    Transportation needs     Medical: Not on file     Non-medical: Not on file   Tobacco Use    Smoking status: Never Smoker    Smokeless tobacco: Never Used   Substance and Sexual Activity    Alcohol use:  Yes    Drug use: Not on file    Sexual activity: Not on file   Lifestyle    Physical activity     Days per week: Not on file     Minutes per session: Not on file    Stress: Not on file   Relationships    Social connections     Talks on phone: Not on file     Gets together: Not on file     Attends Yazidism service: Not on file     Active member of club or organization: Not on file     Attends meetings of clubs or organizations: Not on file     Relationship status: Not on file    Intimate partner violence     Fear of current or ex partner: Not on file     Emotionally abused: Not on file     Physically abused: Not on file     Forced sexual activity: Not on file   Other Topics Concern    Not on file   Social History Narrative    Not on file       Physical Examination       -General: Well-appearing, NAD  1. Wearing make-up today  Multiple flat topped pink papules bilateral lower legs with background photodamage. Multiple scattered benign nevi        Assessment and Plan     1. Flat wart - 39-bilateral lower legs lesion(s) treated w/ liquid nitrogen. Edu re: risk of blister formation, discomfort, scar, hypopigmentation. Discussed wound care. 2. Keratosis, actinic-Efudex 5% cream twice daily for 2 weeks forehead, temples, cheeks. Reviewed side effects, contraindications, proper application. Strict sun avoidance   3. Benign nevus - Benign acquired melanocytic nevi  -Recommend monthly self skin exams   -Educated regarding the ABCDEs of melanoma detection   -Call for any new/changing moles or concerning lesions  -Reviewed sun protective behavior -- sun avoidance during the peak hours of the day, sun-protective clothing (including hat and sunglasses), sunscreen use (water resistant, broad spectrum, SPF at least 30, need for reapplication every 2 to 3 hours), avoidance of tanning beds      4. History of melanoma -total-body skin exam return visit   5.  History of nonmelanoma skin cancer for an total-body skin exam return visit     Follow-up 6 to 8 weeks after Efudex for total-body skin exam.  Treatment of flat warts if needed

## 2021-02-24 ENCOUNTER — OFFICE VISIT (OUTPATIENT)
Dept: DERMATOLOGY | Age: 56
End: 2021-02-24
Payer: COMMERCIAL

## 2021-02-24 VITALS — TEMPERATURE: 97.6 F

## 2021-02-24 DIAGNOSIS — D22.9 BENIGN NEVUS: ICD-10-CM

## 2021-02-24 DIAGNOSIS — D48.5 NEOPLASM OF UNCERTAIN BEHAVIOR OF SKIN: Primary | ICD-10-CM

## 2021-02-24 DIAGNOSIS — L57.0 ACTINIC KERATOSIS TREATED WITH TOPICAL FLUOROURACIL (5FU): ICD-10-CM

## 2021-02-24 DIAGNOSIS — Z85.820 HISTORY OF MELANOMA: ICD-10-CM

## 2021-02-24 DIAGNOSIS — L57.8 DIFFUSE PHOTODAMAGE OF SKIN: ICD-10-CM

## 2021-02-24 DIAGNOSIS — Z85.828 HISTORY OF NONMELANOMA SKIN CANCER: ICD-10-CM

## 2021-02-24 DIAGNOSIS — B07.8 FLAT WART: ICD-10-CM

## 2021-02-24 PROCEDURE — 99214 OFFICE O/P EST MOD 30 MIN: CPT | Performed by: DERMATOLOGY

## 2021-02-24 PROCEDURE — 11102 TANGNTL BX SKIN SINGLE LES: CPT | Performed by: DERMATOLOGY

## 2021-02-24 PROCEDURE — 17111 DESTRUCTION B9 LESIONS 15/>: CPT | Performed by: DERMATOLOGY

## 2021-02-24 NOTE — Clinical Note
I accidentally hit review for pathology- LM/ MIS- I already spoke w patient. Pls refer to [facility] for WLE. Pls send pathology/ photo/ office visit.

## 2021-02-24 NOTE — PROGRESS NOTES
[facility] – [place], [place]) Dermatology  Kristine Chou M.D.  [phone]       Ronda Betancourt  1965    48 y.o. female     Date of Visit: 2/24/2021    Chief Complaint:   Chief Complaint   Patient presents with    Lesion(s)     TBSE        I was asked to see this patient by Dr. Pitts ref. provider found. History of Present Illness:  1. Total-body skin exam    Completed Efudex 5% cream twice daily for 2 weeks forehead, temples, chest-multiple discrete erythematous papules that resolved without difficulty. Still has postinflammatory erythema. No difficulty with sunburn or infection    Flat warts bilateral lower legs-good improvement after sequential treatment with liquid nitrogen. Still has multiple lesions but fewer than she did initially    Diffuse background photodamage-wears hats, sunscreen. Practices sun avoidance    Multiple nevi. Stable in size, shape, color.   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 wide local excision  5/12 left inferior parasternal chest superficial basal cell carcinoma  5/12 left superior parasternal chest nodular basal cell carcinoma  5/12 left parasternal chest superficial basal cell carcinoma  9/14 melanoma in situ left posterior shoulder status post excision  9/14 right lower back dysplastic nevus with moderate dysplasia  7/15 superficial basal cell carcinoma mid upper back, mid lower back, right anterior leg status post curettage  7/15 malignant melanoma Breslow depth 0.7 without ulceration, no progression, mitotic rate less than 1 left anterior arm status post wide local excision     3/17 superficial basal cell carcinoma left anterior shoulder status post Aldara at outside hospital, recurrent. Sky Quesada treated with curettage. Recurrent-refer to Mohs  11/17 Right upper back superficial basal cell carcinoma status post curettage  11/17 left lateral shoulder-recurrent basal cell carcinoma status post Aldara, curettage 3/17,Mohs 12/17 4/18 right lateral upper arm superficial basal cell carcinoma status post curettage  7/18 left inframammary chest Bowen's disease status post curettage  7/18 right lateral back superficial basal cell carcinoma status post curettage  12/18 right mid lateral forehead nodular basal cell carcinoma status post Mohs  5/19 face and chest PDT  12/19 Efudex forehead, temples, preauricular cheeks  10/20 left distal medial lower leg hypertrophic actinic keratosis status post curettage     Sister with history of malignant melanoma.  Father with a history of nonmelanoma skin cancer    Review of Systems:  Constitutional: Reports general sense of well-being       Past Medical History, Surgical History, Family History, Medications and Allergies reviewed. Social History:   Social History     Socioeconomic History    Marital status:      Spouse name: Not on file    Number of children: Not on file    Years of education: Not on file    Highest education level: Not on file   Occupational History    Not on file   Social Needs    Financial resource strain: Not on file    Food insecurity     Worry: Not on file     Inability: Not on file    Transportation needs     Medical: Not on file     Non-medical: Not on file   Tobacco Use    Smoking status: Never Smoker    Smokeless tobacco: Never Used   Substance and Sexual Activity    Alcohol use:  Yes    Drug use: Not on file    Sexual activity: Not on file   Lifestyle    Physical activity     Days per week: Not on file     Minutes per session: Not on file    Stress: Not on file   Relationships    Social connections     Talks on phone: Not on file     Gets together: Not on file     Attends L[employer] service: Not on file     Active member of club or organization: Not on file     Attends meetings of clubs or organizations: Not on file     Relationship status: Not on file    Intimate partner violence     Fear of current or ex partner: Not on file     Emotionally abused: Not on file     Physically abused: Not on file     Forced sexual activity: Not on file   Other Topics Concern    Not on file   Social History Narrative    Not on file       Physical Examination       -General: Well-appearing, NAD  1. Normal affect. Total body skin exam including scalp, face, neck, chest, abdomen, back, bilateral upper extremities, bilateral lower extremities, ocular conjunctiva, external lips, and nails was performed. Examination normal unless stated below. Underwear area not examined. Scattered on the trunk and extremities are multiple well-defined round and oval symmetric smoothly-bordered uniformly brown macules and papules. 8 mm irregularly pigmented irregularly shaped brown papule right parasternal chest-rule out malignant melanoma   Multiple flattopped pink papules bilateral lower legs and thighs  Diffuse background photodamage with freckling and lentigines  Postinflammatory erythema forehead, temples, chest        Assessment and Plan     1. Neoplasm of uncertain behavior of skin-right parasternal chest--Discussed possible diagnosis. Patient agreeable to biopsy. Verbal consent obtained after risks (infection, bleeding, scar), benefits and alternatives explained. -Area(s) to be biopsied were marked with a surgical pen. Site(s) were cleansed with alcohol. Local anesthesia achieved with 1% lidocaine with epinephrine/sodium bicarbonate. Shave biopsy performed with a razor blade. Hemostasis was achieved with aluminum chloride. The wound(s) were dressed with petrolatum and covered with a bandage. Specimen(s) sent to pathology. Pt educated re: risk of bleeding, infection, scar and wound care instructions. 2. Flat wart -22-legs lesion(s) treated w/ liquid nitrogen. Edu re: risk of blister formation, discomfort, scar, hypopigmentation. Discussed wound care. 3. Benign nevus - Benign acquired melanocytic nevi  -Recommend monthly self skin exams   -Educated regarding the ABCDEs of melanoma detection   -Call for any new/changing moles or concerning lesions  -Reviewed sun protective behavior -- sun avoidance during the peak hours of the day, sun-protective clothing (including hat and sunglasses), sunscreen use (water resistant, broad spectrum, SPF at least 30, need for reapplication every 2 to 3 hours), avoidance of tanning beds      4. Diffuse photodamage of skin-hats, sunscreen, sun avoidance. High SPF sunscreen that she reapplies-50 or higher. Clothing-UPF clothing   5. Actinic keratosis treated with topical fluorouracil (5FU)-discussed natural history of resolution of postinflammatory erythema over time. Discussed repeat courses of fluorouracil and expectations-plan for treatment next year   6. History of melanoma - No evidence of recurrence. Discussed risk of subsequent skin cancers and increased risk of melanoma. Reviewed importance of monitoring skin for change and sun protection with hats and sunscreen, as well as sun avoidance. 7. History of nonmelanoma skin cancer - No evidence of recurrence. Discussed risk of subsequent skin cancers and increased risk of melanoma. Reviewed importance of monitoring skin for change and sun protection with hats and sunscreen, as well as sun avoidance. Spoke with patient regarding biopsy right parasternal chest-malignant melanoma in situ/lentigo maligna-referred to [facility] for wide local excision.

## 2021-02-26 LAB — DERMATOLOGY PATHOLOGY REPORT: ABNORMAL

## 2021-03-01 ENCOUNTER — TELEPHONE (OUTPATIENT)
Dept: DERMATOLOGY | Age: 56
End: 2021-03-01

## 2021-03-01 DIAGNOSIS — D03.59 MALIGNANT MELANOMA IN SITU OF SKIN OF ANTERIOR CHEST (HCC): Primary | ICD-10-CM

## 2021-05-19 ENCOUNTER — OFFICE VISIT (OUTPATIENT)
Dept: DERMATOLOGY | Age: 56
End: 2021-05-19
Payer: COMMERCIAL

## 2021-05-19 VITALS — TEMPERATURE: 97.9 F

## 2021-05-19 DIAGNOSIS — D03.59 MALIGNANT MELANOMA IN SITU OF SKIN OF ANTERIOR CHEST (HCC): ICD-10-CM

## 2021-05-19 DIAGNOSIS — B07.8 FLAT WART: Primary | ICD-10-CM

## 2021-05-19 PROCEDURE — 17110 DESTRUCTION B9 LES UP TO 14: CPT | Performed by: DERMATOLOGY

## 2021-05-19 NOTE — PROGRESS NOTES
[facility]) Dermatology  Alix Cheek M.D.  [phone]       Violeta Dockeryle  1965    48 y.o. female     Date of Visit: 5/19/2021    Chief Complaint:   Chief Complaint   Patient presents with    Skin Lesion     LN2 legs        I was asked to see this patient by Dr. Pitts ref. provider found. History of Present Illness:  1. Patient presents today for treatment of flat warts on her legs-has noticed improvement since multiple consecutive treatments with liquid nitrogen. Status post wide local excision of malignant melanoma in situ right parasternal chest-healing without difficulty. 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  Then treated with curettage. Recurrent-refer to Mohs  11/17 Right upper back superficial basal cell carcinoma status post curettage  11/17 left lateral shoulder-recurrent basal cell carcinoma status post Aldara, curettage 3/17,Mohs 12/17 4/18 right lateral upper arm superficial basal cell carcinoma status post curettage  7/18 left inframammary chest Bowen's disease status post curettage  7/18 right lateral back superficial basal cell carcinoma status post curettage  12/18 right mid lateral forehead nodular basal cell carcinoma status post Mohs  5/19 face and chest PDT  12/19 Efudex forehead, temples, preauricular cheeks  10/20 left distal medial lower leg hypertrophic actinic keratosis status post curettage  2/21 right parasternal chest melanoma in situ status post wide local excision  Sister with history of malignant melanoma.  Father with a history of nonmelanoma skin cancer    Review of Systems:  Constitutional: Reports general sense of well-being       Past Medical History, Surgical History, Family History, Medications and Allergies reviewed. Social History:   Social History     Socioeconomic History    Marital status:      Spouse name: Not on file    Number of children: Not on file    Years of education: Not on file    Highest education level: Not on file   Occupational History    Not on file   Tobacco Use    Smoking status: Never Smoker    Smokeless tobacco: Never Used   Vaping Use    Vaping Use: Never used   Substance and Sexual Activity    Alcohol use: Yes    Drug use: Not on file    Sexual activity: Not on file   Other Topics Concern    Not on file   Social History Narrative    Not on file     Social Determinants of Health     Financial Resource Strain:     Difficulty of Paying Living Expenses:    Food Insecurity:     Worried About Running Out of Food in the Last Year:     [address] in the Last Year:    Transportation Needs:     Lack of Transportation (Medical):      Lack of Transportation (Non-Medical):    Physical Activity:     Days of Exercise per Week:     Minutes of Exercise per Session:    Stress:     Feeling of Stress :    Social Connections:     Frequency of Communication with Friends and Family:     Frequency of Social Gatherings with Friends and Family:     Attends [employer]regation Services:  Active Member of Clubs or Organizations:     Attends Club or Organization Meetings:     Marital Status:    Intimate Partner Violence:     Fear of Current or Ex-Partner:     Emotionally Abused:     Physically Abused:     Sexually Abused:        Physical Examination       -General: Well-appearing, NAD  1. Multiple flattopped pink papules bilateral thighs and lower legs anteriorly and posteriorly-fewer than she has had in the past  Well-healed excision site right chest      Assessment and Plan     1. Flat wart -12-bilateral legs lesion(s) treated w/ liquid nitrogen. Edu re: risk of blister formation, discomfort, scar, hypopigmentation. Discussed wound care.       2. Malignant melanoma in situ of skin of anterior chest (HCC)-total-body skin exam as scheduled

## 2021-06-30 ENCOUNTER — OFFICE VISIT (OUTPATIENT)
Dept: DERMATOLOGY | Age: 56
End: 2021-06-30
Payer: COMMERCIAL

## 2021-06-30 VITALS — TEMPERATURE: 98.2 F

## 2021-06-30 DIAGNOSIS — B07.8 FLAT WART: ICD-10-CM

## 2021-06-30 DIAGNOSIS — D48.5 NEOPLASM OF UNCERTAIN BEHAVIOR OF SKIN: Primary | ICD-10-CM

## 2021-06-30 DIAGNOSIS — Z85.828 HISTORY OF NONMELANOMA SKIN CANCER: ICD-10-CM

## 2021-06-30 DIAGNOSIS — Z85.820 HISTORY OF MELANOMA: ICD-10-CM

## 2021-06-30 DIAGNOSIS — L91.0 HYPERTROPHIC SCAR: ICD-10-CM

## 2021-06-30 DIAGNOSIS — L57.0 KERATOSIS, ACTINIC: ICD-10-CM

## 2021-06-30 DIAGNOSIS — D22.9 BENIGN NEVUS: ICD-10-CM

## 2021-06-30 PROCEDURE — 11103 TANGNTL BX SKIN EA SEP/ADDL: CPT | Performed by: DERMATOLOGY

## 2021-06-30 PROCEDURE — 99214 OFFICE O/P EST MOD 30 MIN: CPT | Performed by: DERMATOLOGY

## 2021-06-30 PROCEDURE — 17110 DESTRUCTION B9 LES UP TO 14: CPT | Performed by: DERMATOLOGY

## 2021-06-30 PROCEDURE — 11102 TANGNTL BX SKIN SINGLE LES: CPT | Performed by: DERMATOLOGY

## 2021-06-30 NOTE — PROGRESS NOTES
[facility]) Dermatology  Alix Cheek M.D.  [phone]       Violeta Dockeryle  1965    48 y.o. female     Date of Visit: 6/30/2021    Chief Complaint:   Chief Complaint   Patient presents with    Follow-up        I was asked to see this patient by Dr. Pitts ref. provider found. History of Present Illness:  1. Total-body skin exam    New pink papule right anterior forearm-raised, slightly scaly. Not itching, bleeding. Multiple nevi. Stable in size, shape, color. Has not noticed any new or changing pigmented lesions. Actinic keratoses-slow recurrence of actinic keratoses across her forehead, temples. Discrete dry pink papules but not itching, bleeding, growing. Asymptomatic. 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  Then treated with curettage. Recurrent-refer to Mohs  11/17 Right upper back superficial basal cell carcinoma status post curettage  11/17 left lateral shoulder-recurrent basal cell carcinoma status post Aldara, curettage 3/17,Mohs

## 2021-07-02 LAB — DERMATOLOGY PATHOLOGY REPORT: ABNORMAL

## 2021-07-08 ENCOUNTER — TELEPHONE (OUTPATIENT)
Dept: DERMATOLOGY | Age: 56
End: 2021-07-08

## 2021-07-08 DIAGNOSIS — C44.319 BASAL CELL CARCINOMA (BCC) OF GLABELLA: Primary | ICD-10-CM

## 2021-07-08 DIAGNOSIS — C44.319 BASAL CELL CARCINOMA (BCC) OF RIGHT FOREHEAD: ICD-10-CM

## 2021-07-08 NOTE — TELEPHONE ENCOUNTER
----- Message from David Zacarias MD sent at 7/6/2021  7:49 AM EDT -----  Mohs- [facility] schedule w [facility]

## 2021-09-23 ENCOUNTER — OFFICE VISIT (OUTPATIENT)
Dept: DERMATOLOGY | Age: 56
End: 2021-09-23
Payer: COMMERCIAL

## 2021-09-23 VITALS — TEMPERATURE: 97.2 F

## 2021-09-23 DIAGNOSIS — C44.719 BASAL CELL CARCINOMA (BCC) OF LEFT LOWER LEG: ICD-10-CM

## 2021-09-23 DIAGNOSIS — L57.0 ACTINIC KERATOSIS TREATED WITH TOPICAL FLUOROURACIL (5FU): ICD-10-CM

## 2021-09-23 DIAGNOSIS — Z85.820 HISTORY OF MELANOMA: ICD-10-CM

## 2021-09-23 DIAGNOSIS — Z85.828 HISTORY OF NONMELANOMA SKIN CANCER: ICD-10-CM

## 2021-09-23 DIAGNOSIS — L57.0 KERATOSIS, ACTINIC: Primary | ICD-10-CM

## 2021-09-23 DIAGNOSIS — D22.9 BENIGN NEVUS: ICD-10-CM

## 2021-09-23 DIAGNOSIS — L91.0 HYPERTROPHIC SCAR: ICD-10-CM

## 2021-09-23 PROCEDURE — 17003 DESTRUCT PREMALG LES 2-14: CPT | Performed by: DERMATOLOGY

## 2021-09-23 PROCEDURE — 17000 DESTRUCT PREMALG LESION: CPT | Performed by: DERMATOLOGY

## 2021-09-23 PROCEDURE — 17261 DSTRJ MAL LES T/A/L .6-1.0CM: CPT | Performed by: DERMATOLOGY

## 2021-09-23 PROCEDURE — 99214 OFFICE O/P EST MOD 30 MIN: CPT | Performed by: DERMATOLOGY

## 2021-09-23 NOTE — PROGRESS NOTES
[facility]) Dermatology  Niecy Sandhu M.D.  [phone]       Avtar Barger  1965    48 y.o. female     Date of Visit: 9/23/2021    Chief Complaint:   Chief Complaint   Patient presents with    Skin Lesion        I was asked to see this patient by Dr. Pitts ref. provider found. History of Present Illness:  1. Total-body skin exam    Status post Mohs glabella, right upper forehead at hairline-healing without difficulty    Hypertrophic scar right chest at site of excision from malignant melanoma performed 2/21. Prior history of keloids. Multiple nevi. Stable in size, shape, color. Has not noticed any new or changing pigmented lesions. Increasing number of actinic keratoses face, chest-dry but nontender. No discrete lesion growing. 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 wide local excision  5/12 left inferior parasternal chest superficial basal cell carcinoma  5/12 left superior parasternal chest nodular basal cell carcinoma  5/12 left parasternal chest superficial basal cell carcinoma  9/14 melanoma in situ left posterior shoulder status post excision  9/14 right lower back dysplastic nevus with moderate dysplasia  7/15 superficial basal cell carcinoma mid upper back, mid lower back, right anterior leg status post curettage  7/15 malignant melanoma Breslow depth 0.7 without ulceration, no progression, mitotic rate less than 1 left anterior arm status post wide local excision     3/17 superficial basal cell carcinoma left anterior shoulder status post Aldara at outside hospital, recurrent.  Then treated with curettage. Recurrent-refer to Mohs  11/17 Right upper back superficial basal cell carcinoma status post curettage  11/17 left lateral shoulder-recurrent basal cell carcinoma status post Aldara, curettage 3/17,Mohs 12/17 4/18 right lateral upper arm superficial basal cell carcinoma status post curettage  7/18 left inframammary chest Bowen's disease status post curettage  7/18 right lateral back superficial basal cell carcinoma status post curettage  12/18 right mid lateral forehead nodular basal cell carcinoma status post Mohs  5/19 face and chest PDT  12/19 Efudex forehead, temples, preauricular cheeks  10/20 left distal medial lower leg hypertrophic actinic keratosis status post curettage  2/21 right parasternal chest melanoma in situ status post wide local excision  7/21 central glabella nodular basal cell carcinoma status post Mohs  7/21 right upper forehead at hairline focally infiltrating basal cell carcinoma status post Mohs      Sister with history of malignant melanoma.  Father with a history of nonmelanoma skin cancer       Review of Systems:  Constitutional: Reports general sense of well-being       Past Medical History, Surgical History, Family History, Medications and Allergies reviewed. Social History:   Social History     Socioeconomic History    Marital status:      Spouse name: Not on file    Number of children: Not on file    Years of education: Not on file    Highest education level: Not on file   Occupational History    Not on file   Tobacco Use    Smoking status: Never Smoker    Smokeless tobacco: Never Used   Vaping Use    Vaping Use: Never used   Substance and Sexual Activity    Alcohol use:  Yes    Drug use: Not on file    Sexual activity: Not on file   Other Topics Concern    Not on file   Social History Narrative    Not on file     Social Determinants of Health     Financial Resource Strain:     Difficulty of Paying Living Expenses:    Food Insecurity:     Worried About Running Out of Food in the Last Year:     [address] in the Last Year:    Transportation Needs:     Lack of Transportation (Medical):  Lack of Transportation (Non-Medical):    Physical Activity:     Days of Exercise per Week:     Minutes of Exercise per Session:    Stress:     Feeling of Stress :    Social Connections:     Frequency of Communication with Friends and Family:     Frequency of Social Gatherings with Friends and Family:     Attends [employer]ology Services:     Active Member of Clubs or Organizations:     Attends Club or Organization Meetings:     Marital Status:    Intimate Partner Violence:     Fear of Current or Ex-Partner:     Emotionally Abused:     Physically Abused:     Sexually Abused:        Physical Examination       -General: Well-appearing, NAD  1. Normal affect. Total body skin exam including scalp, face, neck, chest, abdomen, back, bilateral upper extremities, bilateral lower extremities, ocular conjunctiva, external lips, and nails was performed. Examination normal unless stated below. Underwear area not examined. Scattered on the trunk and extremities are multiple well-defined round and oval symmetric smoothly-bordered uniformly brown macules and papules. Gritty erythematous papule right thigh, sternal notch  Background actinic damage with multiple gritty erythematous papules forehead, temples, cheeks, upper cutaneous lip, nasal dorsum  Well-healed scars no sign of recurrence  Left distal anterior lower leg 7 mm thin erythematous papule rule out basal cell carcinoma      Assessment and Plan     1. Basal cell carcinoma-left distal anterior lower leg--Discussed possible diagnosis. Patient agreeable to biopsy. Verbal consent obtained after risks (infection, bleeding, scar), benefits and alternatives explained. -Area(s) to be biopsied were marked with a surgical pen. Site(s) were cleansed with alcohol. Local anesthesia achieved with 1% lidocaine with epinephrine/sodium bicarbonate. Shave biopsy performed with a razor blade. Hemostasis was achieved with aluminum chloride.  The wound(s) were dressed with petrolatum and covered with a bandage. Specimen(s) sent to pathology. Pt educated re: risk of bleeding, infection, scar and wound care instructions. Curettage performed after informed written consent obtained following explanation of risks, benefits, alternatives  -Local anesthesia acheived with 1% lidocaine with epinephrine/sodium bicarbonate. Sharp curettage performed in 3 different directions. First pass size 9mm. Hemostasis obtained with aluminum chloride.   -Edu re: bleeding, discomfort, infection, scar  -Edu re: wound care, risk of recurrence     2. Keratosis, actinic -right thigh, sternal notch-2 lesion(s) treated w/ liquid nitrogen. Edu re: risk of blister formation, discomfort, scar, hypopigmentation. Discussed wound care. 3. Actinic keratosis treated with topical fluorouracil (5FU)-Efudex 5% cream twice daily for 12-14 days forehead, temples, cheeks, nasal dorsum, upper cutaneous lip. Reviewed side effects, contraindications, proper application. Strict sun avoidance. Patient will treat in either December or Garnetta Forward will treat until actinic keratoses have crusted and desquamate   4. Benign nevus - Benign acquired melanocytic nevi  -Recommend monthly self skin exams   -Educated regarding the ABCDEs of melanoma detection   -Call for any new/changing moles or concerning lesions  -Reviewed sun protective behavior -- sun avoidance during the peak hours of the day, sun-protective clothing (including hat and sunglasses), sunscreen use (water resistant, broad spectrum, SPF at least 30, need for reapplication every 2 to 3 hours), avoidance of tanning beds      5. Hypertrophic scar - -IL kenalog 20 mg/ml (40 mg/cc of Kenalog diluted 50-50 with 1% lidocaine); total 0.15 ml to 1 lesion(s). Edu re: atrophy, dyspigmentation. 6. History of melanoma - No evidence of recurrence. Discussed risk of subsequent skin cancers and increased risk of melanoma.  Reviewed importance of monitoring skin for change and sun protection with hats and sunscreen, as well as sun avoidance. 7. History of nonmelanoma skin cancer - No evidence of recurrence. Discussed risk of subsequent skin cancers and increased risk of melanoma. Reviewed importance of monitoring skin for change and sun protection with hats and sunscreen, as well as sun avoidance.      Addendum: Left distal anterior lower leg superficial basal cell carcinoma status post curettage

## 2021-09-27 LAB — DERMATOLOGY PATHOLOGY REPORT: ABNORMAL

## 2021-12-14 ENCOUNTER — OFFICE VISIT (OUTPATIENT)
Dept: DERMATOLOGY | Age: 56
End: 2021-12-14
Payer: COMMERCIAL

## 2021-12-14 VITALS — TEMPERATURE: 96.8 F

## 2021-12-14 DIAGNOSIS — D48.5 NEOPLASM OF UNCERTAIN BEHAVIOR OF SKIN: Primary | ICD-10-CM

## 2021-12-14 DIAGNOSIS — L57.0 ACTINIC KERATOSIS TREATED WITH TOPICAL FLUOROURACIL (5FU): ICD-10-CM

## 2021-12-14 DIAGNOSIS — L91.0 HYPERTROPHIC SCAR: ICD-10-CM

## 2021-12-14 DIAGNOSIS — D22.9 BENIGN NEVUS: ICD-10-CM

## 2021-12-14 DIAGNOSIS — Z85.828 HISTORY OF NONMELANOMA SKIN CANCER: ICD-10-CM

## 2021-12-14 DIAGNOSIS — C44.519 BCC (BASAL CELL CARCINOMA), BACK: ICD-10-CM

## 2021-12-14 DIAGNOSIS — B07.8 FLAT WART: ICD-10-CM

## 2021-12-14 DIAGNOSIS — Z85.820 HISTORY OF MELANOMA: ICD-10-CM

## 2021-12-14 PROCEDURE — 17261 DSTRJ MAL LES T/A/L .6-1.0CM: CPT | Performed by: DERMATOLOGY

## 2021-12-14 PROCEDURE — 17110 DESTRUCTION B9 LES UP TO 14: CPT | Performed by: DERMATOLOGY

## 2021-12-14 PROCEDURE — 99214 OFFICE O/P EST MOD 30 MIN: CPT | Performed by: DERMATOLOGY

## 2021-12-14 PROCEDURE — 11900 INJECT SKIN LESIONS </W 7: CPT | Performed by: DERMATOLOGY

## 2021-12-14 PROCEDURE — 11102 TANGNTL BX SKIN SINGLE LES: CPT | Performed by: DERMATOLOGY

## 2021-12-14 RX ORDER — TRIAMCINOLONE ACETONIDE 40 MG/ML
4 INJECTION, SUSPENSION INTRA-ARTICULAR; INTRAMUSCULAR ONCE
Status: COMPLETED | OUTPATIENT
Start: 2021-12-14 | End: 2021-12-14

## 2021-12-14 RX ORDER — FLUOROURACIL 50 MG/G
CREAM TOPICAL
Qty: 40 G | Refills: 0 | Status: SHIPPED | OUTPATIENT
Start: 2021-12-14 | End: 2022-08-23

## 2021-12-14 RX ADMIN — TRIAMCINOLONE ACETONIDE 4 MG: 40 INJECTION, SUSPENSION INTRA-ARTICULAR; INTRAMUSCULAR at 09:01

## 2021-12-14 NOTE — PROGRESS NOTES
[facility]) Dermatology  Julián Casper M.D.  [phone]       Angelito Galvez  1965    55 y.o. female     Date of Visit: 12/14/2021    Chief Complaint:   Chief Complaint   Patient presents with    Skin Lesion        I was asked to see this patient by Dr. Pitts ref. provider found. History of Present Illness:  1. Total-body skin exam      Multiple nevi-stable in size, shape, color. Has not noticed any new or changing pigmented lesions. Actinic keratoses-progressive forehead, cheeks, upper lip, nose. Multiple dry papules. Not itching, bleeding. Asymptomatic. Plan for Efudex to in January. Flat warts lower legs, left dorsal hand-improved overall, but a few new lesions that have increased in size.   Still not shaving her legs-using Ethan Flakito for hair removal.    Hypertrophic scar chest at site of removal of malignant melanoma in situ 2/21 right parasternal chest.  Remains hypertrophic    No history of labial herpes simplex      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 wide local excision  5/12 left inferior parasternal chest superficial basal cell carcinoma  5/12 left superior parasternal chest nodular basal cell carcinoma  5/12 left parasternal chest superficial basal cell carcinoma  9/14 melanoma in situ left posterior shoulder status post excision  9/14 right lower back dysplastic nevus with moderate dysplasia  7/15 superficial basal cell carcinoma mid upper back, mid lower back, right anterior leg status post curettage  7/15 malignant melanoma Breslow depth 0.7 without ulceration, no progression, mitotic rate less than 1 left anterior arm status post wide local excision     3/17 superficial basal cell carcinoma left anterior shoulder status post Aldara at outside hospital, recurrent.  Then treated with curettage. Recurrent-refer to Mohs  11/17 Right upper back superficial basal cell carcinoma status post curettage  11/17 left lateral shoulder-recurrent basal cell carcinoma status post Aldara, curettage 3/17,Mohs 12/17 4/18 right lateral upper arm superficial basal cell carcinoma status post curettage  7/18 left inframammary chest Bowen's disease status post curettage  7/18 right lateral back superficial basal cell carcinoma status post curettage  12/18 right mid lateral forehead nodular basal cell carcinoma status post Mohs  5/19 face and chest PDT  12/19 Efudex forehead, temples, preauricular cheeks  10/20 left distal medial lower leg hypertrophic actinic keratosis status post curettage  2/21 right parasternal chest melanoma in situ status post wide local excision  7/21 central glabella nodular basal cell carcinoma status post Mohs  7/21 right upper forehead at hairline focally infiltrating basal cell carcinoma status post Mohs  9/21 left distal anterior lower leg superficial basal cell carcinoma status post curettage        Sister with history of malignant melanoma.  Father with a history of nonmelanoma skin cancer       Review of Systems:  Constitutional: Reports general sense of well-being       Past Medical History, Surgical History, Family History, Medications and Allergies reviewed. Social History:   Social History     Socioeconomic History    Marital status:      Spouse name: Not on file    Number of children: Not on file    Years of education: Not on file    Highest education level: Not on file   Occupational History    Not on file   Tobacco Use    Smoking status: Never Smoker    Smokeless tobacco: Never Used   Vaping Use    Vaping Use: Never used   Substance and Sexual Activity    Alcohol use:  Yes    Drug use: Not on file    Sexual activity: Not on file   Other Topics Concern    Not on file   Social History Narrative    Not on file     Social Determinants of Health     Financial Resource Strain:     Difficulty of Paying Living Expenses: Not on file   Food Insecurity:     Worried About Running Out of Food in the Last Year: Not on file    Adrianne of Food in the Last Year: Not on file   Transportation Needs:     Lack of Transportation (Medical): Not on file    Lack of Transportation (Non-Medical): Not on file   Physical Activity:     Days of Exercise per Week: Not on file    Minutes of Exercise per Session: Not on file   Stress:     Feeling of Stress : Not on file   Social Connections:     Frequency of Communication with Friends and Family: Not on file    Frequency of Social Gatherings with Friends and Family: Not on file    Attends Christian Services: Not on file    Active Member of [address] [place] [address] [employer] or Organizations: Not on file    Attends Club or Organization Meetings: Not on file    Marital Status: Not on file   Intimate Partner Violence:     Fear of Current or Ex-Partner: Not on file    Emotionally Abused: Not on file    Physically Abused: Not on file    Sexually Abused: Not on file   Housing Stability:     Unable to Pay for Housing in the Last Year: Not on file    Number of [place] in the Last Year: Not on file    Unstable Housing in the Last Year: Not on file       Physical Examination       -General: Well-appearing, NAD  1. Normal affect. Total body skin exam including scalp, face, neck, chest, abdomen, back, bilateral upper extremities, bilateral lower extremities, ocular conjunctiva, external lips, and nails was performed. Examination normal unless stated below. Underwear area not examined. Scattered on the trunk and extremities are multiple well-defined round and oval symmetric smoothly-bordered uniformly brown macules and papules.   Flattopped pink papules left dorsal hand, lower legs right greater than left and dorsal feet  Gritty erythematous papules forehead, temples, cheeks, nasal dorsum, actinic change across her upper lip hypertrophic scar right parasternal chest    Well-healed scars no sign of recurrence    Left lower back 8 mm papule-I DN adjacent to brown irregular papule, rule out atypia/malignant melanoma  Right upper paraspinal back 8 mm pink papule rule out basal cell carcinoma               Assessment and Plan     1. Neoplasm of uncertain behavior of skin-left lower back, right upper paraspinal back--Discussed possible diagnosis. Patient agreeable to biopsy. Verbal consent obtained after risks (infection, bleeding, scar), benefits and alternatives explained. -Area(s) to be biopsied were marked with a surgical pen. Site(s) were cleansed with alcohol. Local anesthesia achieved with 1% lidocaine with epinephrine/sodium bicarbonate. Shave biopsy performed with a razor blade. Hemostasis was achieved with aluminum chloride. The wound(s) were dressed with petrolatum and covered with a bandage. Specimen(s) sent to pathology. Pt educated re: risk of bleeding, infection, scar and wound care instructions. Right upper paraspinal back only:    Curettage performed after informed written consent obtained following explanation of risks, benefits, alternatives  -Local anesthesia acheived with 1% lidocaine with epinephrine/sodium bicarbonate. Sharp curettage performed in 3 different directions. First pass size 10mm. Hemostasis obtained with aluminum chloride.   -Edu re: bleeding, discomfort, infection, scar  -Edu re: wound care, risk of recurrence     2. Benign nevus - Benign acquired melanocytic nevi  -Recommend monthly self skin exams   -Educated regarding the ABCDEs of melanoma detection   -Call for any new/changing moles or concerning lesions  -Reviewed sun protective behavior -- sun avoidance during the peak hours of the day, sun-protective clothing (including hat and sunglasses), sunscreen use (water resistant, broad spectrum, SPF at least 30, need for reapplication every 2 to 3 hours), avoidance of tanning beds      3. Hypertrophic scar - -IL kenalog 40 mg/ml; total 0.1 ml to 1 lesion(s). Edu re: atrophy, dyspigmentation. 4. Actinic keratosis treated with topical fluorouracil (5FU)-Efudex 5% cream twice daily for 2 weeks or until lesions become erythematous and crust-forehead, temples, cheeks, nasal dorsum, upper lip. Avoid skin folds. Reviewed side effects, contraindications, proper application. Patient aware of the need for strict sun avoidance. 5. Flat wart -7 dorsal hands, feet, lower legs- lesion(s) treated w/ liquid nitrogen. Edu re: risk of blister formation, discomfort, scar, hypopigmentation. Discussed wound care. 6. History of melanoma - No evidence of recurrence. Discussed risk of subsequent skin cancers and increased risk of melanoma. Reviewed importance of monitoring skin for change and sun protection with hats and sunscreen, as well as sun avoidance. 7. History of nonmelanoma skin cancer - No evidence of recurrence. Discussed risk of subsequent skin cancers and increased risk of melanoma. Reviewed importance of monitoring skin for change and sun protection with hats and sunscreen, as well as sun avoidance.

## 2021-12-16 LAB — DERMATOLOGY PATHOLOGY REPORT: ABNORMAL

## 2022-04-04 ENCOUNTER — TELEPHONE (OUTPATIENT)
Dept: DERMATOLOGY | Age: 57
End: 2022-04-04

## 2022-04-04 NOTE — TELEPHONE ENCOUNTER
Pt calling states her bottom lip is blistered not sure if it is from taking medication Efudex states she finished using it last Tuesday want to know what she can use on her lips pls return call back @ [phone] to discuss

## 2022-04-04 NOTE — TELEPHONE ENCOUNTER
Spoke with patient to notify to keep vaseline or aquaphor or her lips and to send a photo via [employer]. Patient stated understanding. Alexia Aguirre will return call to patient with further instructions after Dr. Stephanie Huntley reviews photo.

## 2022-04-05 NOTE — TELEPHONE ENCOUNTER
Spoke with patient, notified patient that Dr. Eduin Tariq would like her to use the Vaseline or Aquaphor until healed and to be very careful to avoid sun. Patient stated understanding.

## 2022-04-20 ENCOUNTER — OFFICE VISIT (OUTPATIENT)
Dept: DERMATOLOGY | Age: 57
End: 2022-04-20
Payer: COMMERCIAL

## 2022-04-20 VITALS — TEMPERATURE: 97.9 F

## 2022-04-20 DIAGNOSIS — L57.0 ACTINIC KERATOSIS TREATED WITH TOPICAL FLUOROURACIL (5FU): ICD-10-CM

## 2022-04-20 DIAGNOSIS — L57.8 DIFFUSE PHOTODAMAGE OF SKIN: ICD-10-CM

## 2022-04-20 DIAGNOSIS — Z85.828 HISTORY OF NONMELANOMA SKIN CANCER: ICD-10-CM

## 2022-04-20 DIAGNOSIS — D48.5 NEOPLASM OF UNCERTAIN BEHAVIOR OF SKIN: Primary | ICD-10-CM

## 2022-04-20 DIAGNOSIS — Z85.820 HISTORY OF MELANOMA: ICD-10-CM

## 2022-04-20 DIAGNOSIS — L57.0 AK (ACTINIC KERATOSIS): ICD-10-CM

## 2022-04-20 DIAGNOSIS — D22.9 BENIGN NEVUS: ICD-10-CM

## 2022-04-20 PROCEDURE — 17000 DESTRUCT PREMALG LESION: CPT | Performed by: DERMATOLOGY

## 2022-04-20 PROCEDURE — 99214 OFFICE O/P EST MOD 30 MIN: CPT | Performed by: DERMATOLOGY

## 2022-04-20 PROCEDURE — 11102 TANGNTL BX SKIN SINGLE LES: CPT | Performed by: DERMATOLOGY

## 2022-04-20 NOTE — PROGRESS NOTES
[facility]) Dermatology  Lore Arevalo M.D.  [phone]       Sabino Rubalcava  1965    55 y.o. female     Date of Visit: 4/20/2022    Chief Complaint:   Chief Complaint   Patient presents with    Lesion(s)     skin exam        I was asked to see this patient by Dr. Pitts ref. provider found. History of Present Illness:  1. Total-body skin exam    Status post Efudex forehead, upper lip, nose at the end of March-also developed ulcerations on her lower lip, does not think that she was in the sun. Ulcerations have healed. Still with postinflammatory erythema at site of Efudex. Does note that she had very few actinic keratoses develop on her forehead while using Efudex-did have more involvement on her nasal dorsum and upper lip. Multiple nevi. Stable in size, shape, color. Has not noticed any new or changing pigmented lesions. Diffuse background photodamage-progressive freckling and lentigines of sun exposed areas. Does wear hats and sunscreen consistently. Does monitor her skin for change.           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  Then treated with curettage. Recurrent-refer to Mohs  11/17 Right upper back superficial basal cell carcinoma status post curettage  11/17 left lateral shoulder-recurrent basal cell carcinoma status post Aldara, curettage 3/17,Mohs 12/17 4/18 right lateral upper arm superficial basal cell carcinoma status post curettage  7/18 left inframammary chest Bowen's disease status post curettage  7/18 right lateral back superficial basal cell carcinoma status post curettage  12/18 right mid lateral forehead nodular basal cell carcinoma status post Mohs  5/19 face and chest PDT  12/19 Efudex forehead, temples, preauricular cheeks  10/20 left distal medial lower leg hypertrophic actinic keratosis status post curettage  2/21 right parasternal chest melanoma in situ status post wide local excision  7/21 central glabella nodular basal cell carcinoma status post Mohs  7/21 right upper forehead at hairline focally infiltrating basal cell carcinoma status post Mohs  9/21 left distal anterior lower leg superficial basal cell carcinoma status post curettage  12/21 right upper paraspinal back nodular and superficial basal cell carcinoma status post curettage  3/22 Efudex forehead, nose, upper lip     Sister with history of malignant melanoma.  Father with a history of nonmelanoma skin cancer       Review of Systems:  Constitutional: Reports general sense of well-being       Past Medical History, Surgical History, Family History, Medications and Allergies reviewed.     Social History:   Social History     Socioeconomic History    Marital status:      Spouse name: Not on file    Number of children: Not on file    Years of education: Not on file    Highest education level: Not on file   Occupational History    Not on file   Tobacco Use    Smoking status: Never Smoker    Smokeless tobacco: Never Used   Vaping Use    Vaping Use: Never used   Substance and Sexual Activity    Alcohol use: Yes    Drug use: Not on file    Sexual activity: Not on file   Other Topics Concern    Not on file   Social History Narrative    Not on file     Social Determinants of Health     Financial Resource Strain:     Difficulty of Paying Living Expenses: Not on file   Food Insecurity:     Worried About Running Out of Food in the Last Year: Not on file    Adrianne of Food in the Last Year: Not on file   Transportation Needs:     Lack of Transportation (Medical): Not on file    Lack of Transportation (Non-Medical): Not on file   Physical Activity:     Days of Exercise per Week: Not on file    Minutes of Exercise per Session: Not on file   Stress:     Feeling of Stress : Not on file   Social Connections:     Frequency of Communication with Friends and Family: Not on file    Frequency of Social Gatherings with Friends and Family: Not on file    Attends Congregational Services: Not on file    Active Member of [address] [place] [address] or Organizations: Not on file    Attends Club or Organization Meetings: Not on file    Marital Status: Not on file   Intimate Partner Violence:     Fear of Current or Ex-Partner: Not on file    Emotionally Abused: Not on file    Physically Abused: Not on file    Sexually Abused: Not on file   Housing Stability:     Unable to Pay for Housing in the Last Year: Not on file    Number of [place] in the Last Year: Not on file    Unstable Housing in the Last Year: Not on file       Physical Examination       -General: Well-appearing, NAD  1. Normal affect. Total body skin exam including scalp, face, neck, chest, abdomen, back, bilateral upper extremities, bilateral lower extremities, ocular conjunctiva, external lips, and nails was performed. Examination normal unless stated below. Underwear area not examined.   Scattered on the trunk and extremities are multiple well-defined round and oval symmetric smoothly-bordered uniformly brown macules and papules. Gritty erythematous papule central forehead. Postinflammatory erythema upper lip  Diffuse background photodamage with freckling and lentigines    Left central chest 9 mm erythematous papule adjacent to scar-rule out basal cell carcinoma              Assessment and Plan     1. Neoplasm of uncertain behavior of skin-left central chest--Discussed possible diagnosis. Patient agreeable to biopsy. Verbal consent obtained after risks (infection, bleeding, scar), benefits and alternatives explained. -Area(s) to be biopsied were marked with a surgical pen. Site(s) were cleansed with alcohol. Local anesthesia achieved with 1% lidocaine with epinephrine/sodium bicarbonate. Shave biopsy performed with a razor blade. Hemostasis was achieved with aluminum chloride. The wound(s) were dressed with petrolatum and covered with a bandage. Specimen(s) sent to pathology. Pt educated re: risk of bleeding, infection, scar and wound care instructions. Mohs if positive given location and close proximity to scar although not clearly recurrence-May be new primary   2. AK (actinic keratosis) -central forehead-1 lesion(s) treated w/ liquid nitrogen. Edu re: risk of blister formation, discomfort, scar, hypopigmentation. Discussed wound care. 3. Actinic keratosis treated with topical fluorouracil (5FU)-healing well-discussed natural history of resolution of postinflammatory erythema time. Excellent sun protection, especially while healing. Will likely need future courses.    4. Benign nevus - Benign acquired melanocytic nevi  -Recommend monthly self skin exams   -Educated regarding the ABCDEs of melanoma detection   -Call for any new/changing moles or concerning lesions  -Reviewed sun protective behavior -- sun avoidance during the peak hours of the day, sun-protective clothing (including hat and sunglasses), sunscreen use (water resistant, broad spectrum, SPF at least 30, need for reapplication every 2 to 3 hours), avoidance of tanning beds      5. Diffuse photodamage of skin - No evidence of recurrence. Discussed risk of subsequent skin cancers and increased risk of melanoma. Reviewed importance of monitoring skin for change and sun protection with hats and sunscreen, as well as sun avoidance. 6. History of melanoma -No evidence of recurrence. Discussed risk of subsequent skin cancers and increased risk of melanoma. Reviewed importance of monitoring skin for change and sun protection with hats and sunscreen, as well as sun avoidance. 7. History of nonmelanoma skin cancer - No evidence of recurrence. Discussed risk of subsequent skin cancers and increased risk of melanoma. Reviewed importance of monitoring skin for change and sun protection with hats and sunscreen, as well as sun avoidance.

## 2022-04-22 LAB — DERMATOLOGY PATHOLOGY REPORT: ABNORMAL

## 2022-04-25 ENCOUNTER — TELEPHONE (OUTPATIENT)
Dept: DERMATOLOGY | Age: 57
End: 2022-04-25

## 2022-04-25 DIAGNOSIS — C44.519 BASAL CELL CARCINOMA OF CHEST: Primary | ICD-10-CM

## 2022-04-25 NOTE — TELEPHONE ENCOUNTER
----- Message from Eugenie Chaney MD sent at 4/24/2022 11:03 AM EDT -----  [facility] schedule.  Scout [place]ica

## 2022-04-27 NOTE — TELEPHONE ENCOUNTER
Informed patient of biopsy results. Will place referral for Moh's surgery to Dr. Gay Ramesh. Patient verbalized understanding.

## 2022-08-23 ENCOUNTER — OFFICE VISIT (OUTPATIENT)
Dept: DERMATOLOGY | Age: 57
End: 2022-08-23
Payer: COMMERCIAL

## 2022-08-23 DIAGNOSIS — L91.0 HYPERTROPHIC SCAR: ICD-10-CM

## 2022-08-23 DIAGNOSIS — D22.9 BENIGN NEVUS: ICD-10-CM

## 2022-08-23 DIAGNOSIS — Z85.820 HISTORY OF MELANOMA: ICD-10-CM

## 2022-08-23 DIAGNOSIS — L82.0 SEBORRHEIC KERATOSES, INFLAMED: Primary | ICD-10-CM

## 2022-08-23 DIAGNOSIS — L57.0 ACTINIC KERATOSIS TREATED WITH TOPICAL FLUOROURACIL (5FU): ICD-10-CM

## 2022-08-23 DIAGNOSIS — L82.1 SEBORRHEIC KERATOSES: ICD-10-CM

## 2022-08-23 PROCEDURE — 17110 DESTRUCTION B9 LES UP TO 14: CPT | Performed by: DERMATOLOGY

## 2022-08-23 PROCEDURE — 99214 OFFICE O/P EST MOD 30 MIN: CPT | Performed by: DERMATOLOGY

## 2022-08-23 RX ORDER — FLUOROURACIL 50 MG/G
CREAM TOPICAL
Qty: 40 G | Refills: 0 | Status: SHIPPED | OUTPATIENT
Start: 2022-08-23

## 2022-08-23 NOTE — PROGRESS NOTES
[facility]) Dermatology  Angelica Willingham M.D.  [phone]       Yun Anne  1965    55 y.o. female     Date of Visit: 8/23/2022    Chief Complaint:   Chief Complaint   Patient presents with    Skin Lesion        I was asked to see this patient by Dr. Pitts ref. provider found. History of Present Illness:  1. Total-body skin exam    Took [employer] to [address] this week    Multiple nevi. Stable in size, shape, color. Has not noticed any new or changing pigmented lesions. Raised scaly papule right posterior lower leg-has increased in size, becoming easily traumatized. Multiple other seborrheic keratoses torso that are asymptomatic. Not itching, bleeding. 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  5/12 left inferior parasternal chest superficial basal cell carcinoma  5/12 left superior parasternal chest nodular basal cell carcinoma  5/12 left parasternal chest superficial basal cell carcinoma  9/14 melanoma in situ left posterior shoulder status post excision  9/14 right lower back dysplastic nevus with moderate dysplasia  7/15 superficial basal cell carcinoma mid upper back, mid lower back, right anterior leg status post curettage  7/15 malignant melanoma Breslow depth 0.7 without ulceration, no progression, mitotic rate less than 1 left anterior arm status post wide local excision     3/17 superficial basal cell carcinoma left anterior shoulder status post Aldara at outside hospital, recurrent. Then treated with curettage. Recurrent-refer to Mohs  11/17 Right upper back superficial basal cell carcinoma status post curettage  11/17 left lateral shoulder-recurrent basal cell carcinoma status post Aldara, curettage 3/17,Mohs 12/17 4/18 right lateral upper arm superficial basal cell carcinoma status post curettage  7/18 left inframammary chest Bowen's disease status post curettage  7/18 right lateral back superficial basal cell carcinoma status post curettage  12/18 right mid lateral forehead nodular basal cell carcinoma status post Mohs  5/19 face and chest PDT  12/19 Efudex forehead, temples, preauricular cheeks  10/20 left distal medial lower leg hypertrophic actinic keratosis status post curettage  2/21 right parasternal chest melanoma in situ status post wide local excision  7/21 central glabella nodular basal cell carcinoma status post Mohs  7/21 right upper forehead at hairline focally infiltrating basal cell carcinoma status post Mohs  9/21 left distal anterior lower leg superficial basal cell carcinoma status post curettage  12/21 right upper paraspinal back nodular and superficial basal cell carcinoma status post curettage  3/22 Efudex forehead, nose, upper lip  4/22 left central chest nodular and superficial basal cell carcinoma status post Mohs     Sister with history of malignant melanoma. Father with a history of nonmelanoma skin cancer       Review of Systems:  Constitutional: Reports general sense of well-being       Past Medical History, Surgical History, Family History, Medications and Allergies reviewed.     Social History:   Social History     Socioeconomic History    Marital status:      Spouse name: Not on file    Number of children: Not on file    Years of education: Not on file    Highest education level: Not on file   Occupational History    Not on file   Tobacco Use    Smoking status: Never    Smokeless tobacco: Never   Vaping Use    Vaping Use: Never used   Substance and Sexual Activity    Alcohol use: Yes    Drug use: Not on file    Sexual activity: Not on file   Other Topics Concern    Not on file   Social History Narrative    Not on file     Social Determinants of Health     Financial Resource Strain: Not on file   Food Insecurity: Not on file   Transportation Needs: Not on file   Physical Activity: Not on file   Stress: Not on file   Social Connections: Not on file   Intimate Partner Violence: Not on file   Housing Stability: Not on file       Physical Examination       -General: Well-appearing, NAD  1. Normal affect. Total body skin exam including scalp, face, neck, chest, abdomen, back, bilateral upper extremities, bilateral lower extremities, ocular conjunctiva, external lips, and nails was performed. Examination normal unless stated below. Underwear area not examined. Scattered on the trunk and extremities are multiple well-defined round and oval symmetric smoothly-bordered uniformly brown macules and papules. Multiple hyperkeratotic stuck on papules and plaques torso, right posterior lower leg  Diffuse background photodamage with freckling and lentigines. Slightly hypertrophic scar left chest  Slow recurrence of multiple actinic keratoses across her forehead, nasal dorsum    Assessment and Plan     1. Seborrheic keratoses, inflamed - After the risks, complications, and alternatives were explained to the patient, including risk of blister formation, discomfort, scar, hypopigmentation, patient elected to proceed with cryotherapy. 1 lesion(s) on the right posterior lower leg were treated w/ liquid nitrogen using a Cryogun. 1 freeze thaw cycle was performed with a freeze time of 2-5 seconds. There were no immediate complications. Wound care instructions were discussed with the patient. 2. Seborrheic keratoses - Discussed underlying nature of seborrheic keratosis and low risk of malignancy. Treatment reserved for lesions that are itching, bleeding, growing or otherwise becoming bothersome. Discussed monitoring for change with reevaluation for changing lesions.      3. Actinic keratosis treated with topical fluorouracil (5FU)-Efudex 5% cream twice daily for 10 to 12 days forehead, temples, cheeks extending to upper neck, nasal dorsum. Reviewed side effects, contraindications, proper application. She will find a time this winter to treat when she can be out of the sun and when it works with her work schedule   4. Benign nevus - Benign acquired melanocytic nevi  -Recommend monthly self skin exams   -Educated regarding the ABCDEs of melanoma detection   -Call for any new/changing moles or concerning lesions  -Reviewed sun protective behavior -- sun avoidance during the peak hours of the day, sun-protective clothing (including hat and sunglasses), sunscreen use (water resistant, broad spectrum, SPF at least 30, need for reapplication every 2 to 3 hours), avoidance of tanning beds      5. History of melanoma - No evidence of recurrence. Discussed risk of subsequent skin cancers and increased risk of melanoma. Reviewed importance of monitoring skin for change and sun protection with hats and sunscreen, as well as sun avoidance. 6. Hypertrophic scar-start silicone sheeting left chest for prevention of keloid.   If she develops a keloid, intralesional Kenalog

## 2023-03-22 ENCOUNTER — OFFICE VISIT (OUTPATIENT)
Dept: DERMATOLOGY | Age: 58
End: 2023-03-22

## 2023-03-22 DIAGNOSIS — D22.9 BENIGN NEVUS: ICD-10-CM

## 2023-03-22 DIAGNOSIS — L91.0 HYPERTROPHIC SCAR: ICD-10-CM

## 2023-03-22 DIAGNOSIS — Z85.828 HISTORY OF NONMELANOMA SKIN CANCER: ICD-10-CM

## 2023-03-22 DIAGNOSIS — L57.0 ACTINIC KERATOSIS TREATED WITH TOPICAL FLUOROURACIL (5FU): Primary | ICD-10-CM

## 2023-03-22 DIAGNOSIS — Z85.820 HISTORY OF MELANOMA: ICD-10-CM

## 2023-03-22 DIAGNOSIS — B07.8 FLAT WART: ICD-10-CM

## 2023-03-22 NOTE — PROGRESS NOTES
Narrative    Not on file     Social Determinants of Health     Financial Resource Strain: Not on file   Food Insecurity: Not on file   Transportation Needs: Not on file   Physical Activity: Not on file   Stress: Not on file   Social Connections: Not on file   Intimate Partner Violence: Not on file   Housing Stability: Not on file       Physical Examination       -General: Well-appearing, NAD  1. Normal affect. Total body skin exam including scalp, face, neck, chest, abdomen, back, bilateral upper extremities, bilateral lower extremities, ocular conjunctiva, external lips, and nails was performed. Examination normal unless stated below. Underwear area not examined. Scattered on the trunk and extremities are multiple well-defined round and oval symmetric smoothly-bordered uniformly brown macules and papules. Multiple flattopped pink papules lower legs. Hypertrophic scar chest.  Scattered gritty erythematous papules forehead, temples, preauricular cheeks, nasal dorsum. Well-healed scars no sign of recurrence      Assessment and Plan     1. Actinic keratosis treated with topical fluorouracil (5FU)-Efudex 5% cream twice daily for 12 to 14 days or until lesions become erythematous and desquamate. Forehead, temples, preauricular cheeks, nasal dorsum. 2. Hypertrophic scar - -IL kenalog 40 mg/ml; total .1 ml to 1 lesion(s). Edu re: atrophy, dyspigmentation. No charge   3. Benign nevus - Benign acquired melanocytic nevi  -Recommend monthly self skin exams   -Educated regarding the ABCDEs of melanoma detection   -Call for any new/changing moles or concerning lesions  -Reviewed sun protective behavior -- sun avoidance during the peak hours of the day, sun-protective clothing (including hat and sunglasses), sunscreen use (water resistant, broad spectrum, SPF at least 30, need for reapplication every 2 to 3 hours), avoidance of tanning beds      4.  Flat wart -12, lower legs after the risks, complications, and

## 2023-08-29 ENCOUNTER — OFFICE VISIT (OUTPATIENT)
Dept: DERMATOLOGY | Age: 58
End: 2023-08-29
Payer: COMMERCIAL

## 2023-08-29 DIAGNOSIS — L82.1 SEBORRHEIC KERATOSES: ICD-10-CM

## 2023-08-29 DIAGNOSIS — D48.5 NEOPLASM OF UNCERTAIN BEHAVIOR OF SKIN: Primary | ICD-10-CM

## 2023-08-29 DIAGNOSIS — L57.0 AK (ACTINIC KERATOSIS): ICD-10-CM

## 2023-08-29 DIAGNOSIS — Z85.820 HISTORY OF MELANOMA: ICD-10-CM

## 2023-08-29 DIAGNOSIS — Z85.828 HISTORY OF NONMELANOMA SKIN CANCER: ICD-10-CM

## 2023-08-29 DIAGNOSIS — L57.0 ACTINIC KERATOSIS TREATED WITH TOPICAL FLUOROURACIL (5FU): ICD-10-CM

## 2023-08-29 DIAGNOSIS — D22.9 BENIGN NEVUS: ICD-10-CM

## 2023-08-29 PROCEDURE — 99214 OFFICE O/P EST MOD 30 MIN: CPT | Performed by: DERMATOLOGY

## 2023-08-29 PROCEDURE — 17003 DESTRUCT PREMALG LES 2-14: CPT | Performed by: DERMATOLOGY

## 2023-08-29 PROCEDURE — 11102 TANGNTL BX SKIN SINGLE LES: CPT | Performed by: DERMATOLOGY

## 2023-08-29 PROCEDURE — 17000 DESTRUCT PREMALG LESION: CPT | Performed by: DERMATOLOGY

## 2023-08-29 NOTE — PROGRESS NOTES
[facility]) Dermatology  Mari Pearl M.D.  [phone]       Oscar Richmond  1965    65 y.o. female     Date of Visit: 8/29/2023    Chief Complaint:   Chief Complaint   Patient presents with    Skin Lesion        I was asked to see this patient by Dr. Pitts ref. provider found. History of Present Illness:  1. TBSE    Multiple nevi. Patient has not noticed any new or changing pigmented lesions. Stable in size, shape, color. Not itching, bleeding. Seborrheic keratoses. Increasing number of hyperkeratotic stuck on papules and plaques over the torso. No discrete lesion itching, bleeding, becoming symptomatic. Actinic keratoses-along hairline on her upper forehead, left dorsal hand. Dry but not itching, bleeding. Did complete Efudex after her last visit. 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 status post wide local excision  5/12 left inferior parasternal chest superficial basal cell carcinoma  5/12 left superior parasternal chest nodular basal cell carcinoma  5/12 left parasternal chest superficial basal cell carcinoma  9/14 melanoma in situ left posterior shoulder status post excision  9/14 right lower back dysplastic nevus with moderate dysplasia  7/15 superficial basal cell carcinoma mid upper back, mid lower back, right anterior leg status post curettage  7/15 malignant melanoma Breslow depth 0.7 without ulceration, no progression, mitotic rate less than 1 left anterior arm status post wide local excision     3/17 superficial basal cell carcinoma left anterior shoulder status post Aldara at outside hospital, recurrent. Then treated with curettage. Recurrent-refer to Mohs  11/17 Right upper back superficial basal cell carcinoma status post curettage  11/17 left lateral

## 2023-09-01 LAB — DERMATOLOGY PATHOLOGY REPORT: ABNORMAL

## 2023-10-12 ENCOUNTER — OFFICE VISIT (OUTPATIENT)
Dept: DERMATOLOGY | Age: 58
End: 2023-10-12

## 2023-10-12 DIAGNOSIS — L82.0 SEBORRHEIC KERATOSES, INFLAMED: ICD-10-CM

## 2023-10-12 DIAGNOSIS — L28.2 PAPULAR URTICARIA: ICD-10-CM

## 2023-10-12 DIAGNOSIS — L57.0 ACTINIC KERATOSIS TREATED WITH TOPICAL FLUOROURACIL (5FU): ICD-10-CM

## 2023-10-12 DIAGNOSIS — C44.619 BASAL CELL CARCINOMA (BCC) OF LEFT UPPER ARM: Primary | ICD-10-CM

## 2023-10-12 DIAGNOSIS — Z85.828 HISTORY OF NONMELANOMA SKIN CANCER: ICD-10-CM

## 2023-10-12 DIAGNOSIS — Z85.820 HISTORY OF MELANOMA: ICD-10-CM

## 2023-10-12 NOTE — PROGRESS NOTES
complications. Wound care instructions were discussed with the patient. 3. Actinic keratosis treated with topical fluorouracil (5FU)-continue fluorouracil 5% cream for 2 more days, heal with Aquaphor, recheck at follow-up. 4. Papular urticaria-monitor for resolution-discussed expectations of resolution over the next 1 to 2 weeks   5. History of nonmelanoma skin cancer-total-body skin exam as scheduled   6.  History of melanoma  Total-body skin exam as scheduled

## 2024-04-22 ENCOUNTER — OFFICE VISIT (OUTPATIENT)
Dept: DERMATOLOGY | Age: 59
End: 2024-04-22
Payer: COMMERCIAL

## 2024-04-22 DIAGNOSIS — L82.1 SEBORRHEIC KERATOSES: ICD-10-CM

## 2024-04-22 DIAGNOSIS — Z85.820 HISTORY OF MELANOMA: ICD-10-CM

## 2024-04-22 DIAGNOSIS — Z85.828 HISTORY OF NONMELANOMA SKIN CANCER: ICD-10-CM

## 2024-04-22 DIAGNOSIS — L57.0 ACTINIC KERATOSIS TREATED WITH TOPICAL FLUOROURACIL (5FU): Primary | ICD-10-CM

## 2024-04-22 DIAGNOSIS — D22.9 BENIGN NEVUS: ICD-10-CM

## 2024-04-22 PROCEDURE — 99214 OFFICE O/P EST MOD 30 MIN: CPT | Performed by: DERMATOLOGY

## 2024-04-22 NOTE — PROGRESS NOTES
Tobacco Use    Smoking status: Never    Smokeless tobacco: Never   Vaping Use    Vaping Use: Never used   Substance and Sexual Activity    Alcohol use: Yes    Drug use: Not on file    Sexual activity: Not on file   Other Topics Concern    Not on file   Social History Narrative    Not on file     Social Determinants of Health     Financial Resource Strain: Not on file   Food Insecurity: Not on file   Transportation Needs: Not on file   Physical Activity: Not on file   Stress: Not on file   Social Connections: Not on file   Intimate Partner Violence: Not on file   Housing Stability: Not on file       Physical Examination       -General: Well-appearing, NAD  1. Normal affect.  Total body skin exam including scalp, face, neck, chest, abdomen, back, bilateral upper extremities, bilateral lower extremities, ocular conjunctiva, external lips, and nails was performed.  Examination normal unless stated below.  Underwear area not examined.  Scattered on the trunk and extremities are multiple well-defined round and oval symmetric smoothly-bordered uniformly brown macules and papules.  Multiple background gritty erythematous papules forehead, nasal dorsum, periphery of cheeks.  Multiple hyperkeratotic stuck on papules and plaques torso.  Multiple well-healed scars no sign of recurrence.      Assessment and Plan     1. Actinic keratosis treated with topical fluorouracil (5FU)-discussed fluorouracil-defer treatment until weather changes in the fall.  Will need field therapy   2. Benign nevus - Benign acquired melanocytic nevi  -Recommend monthly self skin exams   -Educated regarding the ABCDEs of melanoma detection   -Call for any new/changing moles or concerning lesions  -Reviewed sun protective behavior -- sun avoidance during the peak hours of the day, sun-protective clothing (including hat and sunglasses), sunscreen use (water resistant, broad spectrum, SPF at least 30, need for reapplication every 2 to 3 hours), avoidance

## 2024-09-23 ENCOUNTER — OFFICE VISIT (OUTPATIENT)
Dept: DERMATOLOGY | Age: 59
End: 2024-09-23
Payer: COMMERCIAL

## 2024-09-23 DIAGNOSIS — Z85.820 HISTORY OF MELANOMA: ICD-10-CM

## 2024-09-23 DIAGNOSIS — D48.5 NEOPLASM OF UNCERTAIN BEHAVIOR OF SKIN: Primary | ICD-10-CM

## 2024-09-23 DIAGNOSIS — D22.9 BENIGN NEVUS: ICD-10-CM

## 2024-09-23 DIAGNOSIS — L57.0 ACTINIC KERATOSIS TREATED WITH TOPICAL FLUOROURACIL (5FU): ICD-10-CM

## 2024-09-23 DIAGNOSIS — Z85.828 HISTORY OF NONMELANOMA SKIN CANCER: ICD-10-CM

## 2024-09-23 DIAGNOSIS — L57.0 AK (ACTINIC KERATOSIS): ICD-10-CM

## 2024-09-23 PROCEDURE — 99214 OFFICE O/P EST MOD 30 MIN: CPT | Performed by: DERMATOLOGY

## 2024-09-23 PROCEDURE — 11102 TANGNTL BX SKIN SINGLE LES: CPT | Performed by: DERMATOLOGY

## 2024-09-23 PROCEDURE — 17000 DESTRUCT PREMALG LESION: CPT | Performed by: DERMATOLOGY

## 2024-09-23 RX ORDER — FLUOROURACIL 50 MG/G
CREAM TOPICAL
Qty: 60 G | Refills: 0 | Status: SHIPPED | OUTPATIENT
Start: 2024-09-23

## 2024-09-27 LAB — DERMATOLOGY PATHOLOGY REPORT: NORMAL

## 2025-03-17 ENCOUNTER — OFFICE VISIT (OUTPATIENT)
Age: 60
End: 2025-03-17

## 2025-03-17 DIAGNOSIS — D22.9 BENIGN NEVUS: ICD-10-CM

## 2025-03-17 DIAGNOSIS — L57.0 ACTINIC KERATOSIS TREATED WITH TOPICAL FLUOROURACIL (5FU): ICD-10-CM

## 2025-03-17 DIAGNOSIS — D48.5 NEOPLASM OF UNCERTAIN BEHAVIOR OF SKIN: Primary | ICD-10-CM

## 2025-03-17 DIAGNOSIS — C44.519 BCC (BASAL CELL CARCINOMA), TRUNK: ICD-10-CM

## 2025-03-17 DIAGNOSIS — Z85.828 HISTORY OF NONMELANOMA SKIN CANCER: ICD-10-CM

## 2025-03-17 DIAGNOSIS — D22.9: ICD-10-CM

## 2025-03-17 DIAGNOSIS — L65.8 FEMALE PATTERN ALOPECIA: ICD-10-CM

## 2025-03-17 DIAGNOSIS — Z85.820 HISTORY OF MELANOMA: ICD-10-CM

## 2025-03-17 NOTE — PROGRESS NOTES
wound care instructions.      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    Actinic keratoses-prefers combination salicylic acid and fluorouracil, anticipate approximately yearly treatment    Benign acquired melanocytic nevi  -Recommend monthly self skin exams   -Educated regarding the ABCDEs of melanoma detection   -Call for any new/changing moles or concerning lesions  -Reviewed sun protective behavior -- sun avoidance during the peak hours of the day, sun-protective clothing (including hat and sunglasses), sunscreen use (water resistant, broad spectrum, SPF at least 30, need for reapplication every 2 to 3 hours), avoidance of tanning beds     History of nonmelanoma skin cancer and melanoma-  No evidence of recurrence. Discussed risk of subsequent skin cancers and increased risk of melanoma. Reviewed importance of monitoring skin for change and sun protection with hats and sunscreen, as well as sun avoidance.

## 2025-03-19 ENCOUNTER — RESULTS FOLLOW-UP (OUTPATIENT)
Age: 60
End: 2025-03-19

## 2025-03-19 LAB — DERMATOLOGY PATHOLOGY REPORT: ABNORMAL

## 2025-07-22 ENCOUNTER — OFFICE VISIT (OUTPATIENT)
Age: 60
End: 2025-07-22

## 2025-07-22 DIAGNOSIS — L82.1 SEBORRHEIC KERATOSES: ICD-10-CM

## 2025-07-22 DIAGNOSIS — D22.9 BENIGN NEVUS: ICD-10-CM

## 2025-07-22 DIAGNOSIS — D48.5 NEOPLASM OF UNCERTAIN BEHAVIOR OF SKIN: ICD-10-CM

## 2025-07-22 DIAGNOSIS — C44.612 BASAL CELL CARCINOMA (BCC) OF RIGHT SHOULDER: ICD-10-CM

## 2025-07-22 DIAGNOSIS — L57.0 AK (ACTINIC KERATOSIS): Primary | ICD-10-CM

## 2025-07-22 DIAGNOSIS — Z85.820 HISTORY OF MELANOMA: ICD-10-CM

## 2025-07-22 DIAGNOSIS — Z85.828 HISTORY OF NONMELANOMA SKIN CANCER: ICD-10-CM

## 2025-07-25 LAB — DERMATOLOGY PATHOLOGY REPORT: ABNORMAL

## 2025-07-28 ENCOUNTER — RESULTS FOLLOW-UP (OUTPATIENT)
Age: 60
End: 2025-07-28